# Patient Record
Sex: MALE | Race: BLACK OR AFRICAN AMERICAN | Employment: FULL TIME | URBAN - NONMETROPOLITAN AREA
[De-identification: names, ages, dates, MRNs, and addresses within clinical notes are randomized per-mention and may not be internally consistent; named-entity substitution may affect disease eponyms.]

---

## 2019-04-24 ENCOUNTER — OFFICE VISIT (OUTPATIENT)
Dept: PRIMARY CARE CLINIC | Age: 39
End: 2019-04-24
Payer: COMMERCIAL

## 2019-04-24 VITALS
DIASTOLIC BLOOD PRESSURE: 80 MMHG | WEIGHT: 247 LBS | OXYGEN SATURATION: 98 % | SYSTOLIC BLOOD PRESSURE: 130 MMHG | HEART RATE: 70 BPM | HEIGHT: 67 IN | TEMPERATURE: 97.1 F | BODY MASS INDEX: 38.77 KG/M2

## 2019-04-24 DIAGNOSIS — R53.83 OTHER FATIGUE: ICD-10-CM

## 2019-04-24 DIAGNOSIS — E55.9 VITAMIN D DEFICIENCY: ICD-10-CM

## 2019-04-24 DIAGNOSIS — Z00.00 PHYSICAL EXAM, ANNUAL: ICD-10-CM

## 2019-04-24 DIAGNOSIS — Z13.220 SCREENING FOR CHOLESTEROL LEVEL: ICD-10-CM

## 2019-04-24 DIAGNOSIS — R63.5 WEIGHT GAIN: Primary | ICD-10-CM

## 2019-04-24 DIAGNOSIS — Z76.89 ENCOUNTER TO ESTABLISH CARE: ICD-10-CM

## 2019-04-24 PROCEDURE — 99203 OFFICE O/P NEW LOW 30 MIN: CPT | Performed by: NURSE PRACTITIONER

## 2019-04-24 ASSESSMENT — PATIENT HEALTH QUESTIONNAIRE - PHQ9
SUM OF ALL RESPONSES TO PHQ9 QUESTIONS 1 & 2: 0
SUM OF ALL RESPONSES TO PHQ QUESTIONS 1-9: 0
1. LITTLE INTEREST OR PLEASURE IN DOING THINGS: 0
SUM OF ALL RESPONSES TO PHQ QUESTIONS 1-9: 0
2. FEELING DOWN, DEPRESSED OR HOPELESS: 0

## 2019-04-24 ASSESSMENT — ENCOUNTER SYMPTOMS
SHORTNESS OF BREATH: 0
GASTROINTESTINAL NEGATIVE: 1
WHEEZING: 0
APNEA: 0

## 2019-04-24 NOTE — PROGRESS NOTES
2019    Harshal Brewer (:  1980) is a 44 y.o. male, here for evaluation of the following medical concerns:  Chief Complaint   Patient presents with    New Patient    Allergies    Fatigue    Annual Exam     HPI  Is a 68-year-old male patient who presents to the office to establish care. She states she recently relocated here to take care of his elderly mother. He tells me that he has had a difficult time adjusting to his new schedule as he is working midnight. She is also struggling with seasonal allergies and tells me that he feels tired all the time has had some weight gain and feels that something has changed with his health. He would like to have some routine lab work and a physical examination to determine if he's had any significant concerns as his fatigue level has increased and his activity level has diminished. He states he can tolerate activity but gets extremely fatigued with any ongoing activities. Unsure if this is due to the change in his schedule, living arrangements and his life in general. Currently not in any distress he denies chest pain shortness of breath on my diaphoresis or other symptoms of cardiovascular disease. She is obese with a body mass index of 38.69,nearing morbidly obesity. Is also having some stiffness and arthralgias in both hands. Denies snoring or morning headache. Review of Systems   Constitutional: Positive for activity change and fatigue. Negative for appetite change, chills, diaphoresis, fever and unexpected weight change. HENT: Positive for congestion, postnasal drip and sinus pressure. Negative for trouble swallowing and voice change. Eyes: Negative for visual disturbance. Respiratory: Negative for apnea, shortness of breath and wheezing. Cardiovascular: Negative for chest pain, palpitations and leg swelling. Gastrointestinal: Negative. Endocrine: Negative. Genitourinary: Negative. Musculoskeletal: Positive for arthralgias. Both hands   Skin: Negative. Allergic/Immunologic: Positive for environmental allergies. Neurological: Negative. Psychiatric/Behavioral: Positive for decreased concentration. Negative for agitation, behavioral problems, confusion, dysphoric mood, hallucinations, self-injury, sleep disturbance and suicidal ideas. The patient is not nervous/anxious and is not hyperactive. Prior to Visit Medications    Not on File        Allergies   Allergen Reactions    Morphine Anaphylaxis       Past Medical History:   Diagnosis Date    Allergic rhinitis     Dislocation of right shoulder joint 2000    Laceration of left elbow        History reviewed. No pertinent surgical history.     Social History     Socioeconomic History    Marital status: Single     Spouse name: Not on file    Number of children: Not on file    Years of education: Not on file    Highest education level: Not on file   Occupational History    Not on file   Social Needs    Financial resource strain: Not on file    Food insecurity:     Worry: Not on file     Inability: Not on file    Transportation needs:     Medical: Not on file     Non-medical: Not on file   Tobacco Use    Smoking status: Former Smoker     Packs/day: 0.25     Types: Cigarettes     Start date:      Last attempt to quit: 2006     Years since quittin.3    Smokeless tobacco: Never Used   Substance and Sexual Activity    Alcohol use: Not Currently    Drug use: Never    Sexual activity: Not on file   Lifestyle    Physical activity:     Days per week: Not on file     Minutes per session: Not on file    Stress: Not on file   Relationships    Social connections:     Talks on phone: Not on file     Gets together: Not on file     Attends Yazidism service: Not on file     Active member of club or organization: Not on file     Attends meetings of clubs or organizations: Not on file     Relationship status: Not on file    Intimate partner violence:     Fear of current or ex partner: Not on file     Emotionally abused: Not on file     Physically abused: Not on file     Forced sexual activity: Not on file   Other Topics Concern    Not on file   Social History Narrative    Not on file        Family History   Problem Relation Age of Onset    Cancer Mother 64        Bone Cancer    Diabetes Father     Diabetes Brother        Vitals:    04/24/19 1631   BP: 130/80   Pulse: 70   Temp: 97.1 °F (36.2 °C)   SpO2: 98%   Weight: 247 lb (112 kg)   Height: 5' 7\" (1.702 m)     Estimated body mass index is 38.69 kg/m² as calculated from the following:    Height as of this encounter: 5' 7\" (1.702 m). Weight as of this encounter: 247 lb (112 kg). Physical Exam   Constitutional: He is oriented to person, place, and time. He appears well-developed and well-nourished. HENT:   Head: Normocephalic and atraumatic. Eyes: Pupils are equal, round, and reactive to light. Conjunctivae and EOM are normal.   Neck: Normal range of motion. Neck supple. No JVD present. No thyromegaly present. Cardiovascular: Normal rate, regular rhythm, normal heart sounds and intact distal pulses. No murmur heard. Pulmonary/Chest: Effort normal and breath sounds normal. No respiratory distress. He has no wheezes. He exhibits no tenderness. Abdominal: Soft. Bowel sounds are normal.   Genitourinary:   Genitourinary Comments: Deferred   Musculoskeletal: Normal range of motion. He exhibits no edema. Lymphadenopathy:     He has no cervical adenopathy. Neurological: He is alert and oriented to person, place, and time. He has normal reflexes. Skin: Skin is warm and dry. Psychiatric: He has a normal mood and affect. His behavior is normal. Thought content normal.   Nursing note and vitals reviewed.       ASSESSMENT/PLAN:    Samuel Beverly was seen today for new patient, allergies, fatigue and annual exam.    Diagnoses and all orders for this visit:    Weight gain  Comments:  I think we should check patient's vitamin D level thyroid levels he denies any signs of obstructive sleep apnea. Orders:  -     TSH without Reflex; Future  -     T4; Future  -     Vitamin D 25 Hydroxy; Future  -     Vitamin B12 & Folate; Future    Other fatigue  Comments: This could be due to the change in environment and shift work with associated weight gain feel benefit to checking thyroid levels and vitamin D level  Orders:  -     CBC; Future  -     Comprehensive Metabolic Panel; Future  -     TSH without Reflex; Future  -     T4; Future  -     Vitamin D 25 Hydroxy; Future  -     Vitamin B12 & Folate; Future  -     Testosterone Free and Total, Non-Male; Future    Encounter to establish care    Screening for cholesterol level  -     Lipid Panel; Future    Vitamin D deficiency  -     Vitamin D 25 Hydroxy; Future    Physical exam, annual  Comments:  Discussed importance of health maintenance, diet and exercise,BMI 36.9      EMR Dragon/transcription disclaimer: Much of this encounter note is an electronic transcription/translation of spoken language to printed text. The electronic translation of spoken language may permit erroneous, or at times, nonsensical words or phrases to be inadvertently transcribed. Although I have reviewed the note for such errors, some may still exist        Be accountable for what you eat, lose it lurdes or my fitness pal will help you keep up with calories and exercise. [place] diet is best to follow. Exercise regularly 3-5 x a week at least 30min at a time. Return in about 6 months (around 10/24/2019), or if symptoms worsen or fail to improve. An  electronic signature was used to authenticate this note.     --DINA Gomez on 4/29/2019 at 9:12 AM

## 2019-04-29 ASSESSMENT — ENCOUNTER SYMPTOMS
VOICE CHANGE: 0
SINUS PRESSURE: 1
TROUBLE SWALLOWING: 0

## 2019-11-02 ENCOUNTER — HOSPITAL ENCOUNTER (EMERGENCY)
Facility: HOSPITAL | Age: 39
Discharge: HOME | End: 2019-11-02
Admitting: EMERGENCY MEDICINE

## 2019-11-02 ENCOUNTER — OFFICE VISIT (OUTPATIENT)
Dept: URGENT CARE | Age: 39
End: 2019-11-02

## 2019-11-02 ENCOUNTER — APPOINTMENT (OUTPATIENT)
Dept: GENERAL RADIOLOGY | Facility: HOSPITAL | Age: 39
End: 2019-11-02

## 2019-11-02 ENCOUNTER — APPOINTMENT (OUTPATIENT)
Dept: ULTRASOUND IMAGING | Facility: HOSPITAL | Age: 39
End: 2019-11-02

## 2019-11-02 VITALS
RESPIRATION RATE: 16 BRPM | TEMPERATURE: 98.9 F | HEART RATE: 78 BPM | WEIGHT: 249.8 LBS | DIASTOLIC BLOOD PRESSURE: 88 MMHG | BODY MASS INDEX: 39.21 KG/M2 | SYSTOLIC BLOOD PRESSURE: 136 MMHG | HEIGHT: 67 IN | OXYGEN SATURATION: 97 %

## 2019-11-02 VITALS
SYSTOLIC BLOOD PRESSURE: 135 MMHG | OXYGEN SATURATION: 95 % | DIASTOLIC BLOOD PRESSURE: 92 MMHG | TEMPERATURE: 97.6 F | WEIGHT: 252 LBS | RESPIRATION RATE: 16 BRPM | BODY MASS INDEX: 39.55 KG/M2 | HEIGHT: 67 IN | HEART RATE: 82 BPM

## 2019-11-02 DIAGNOSIS — M79.604 PAIN OF RIGHT LOWER EXTREMITY: Primary | ICD-10-CM

## 2019-11-02 DIAGNOSIS — I82.811 SUPERFICIAL THROMBOSIS OF RIGHT LOWER EXTREMITY: Primary | ICD-10-CM

## 2019-11-02 LAB
ANION GAP SERPL CALCULATED.3IONS-SCNC: 10 MMOL/L (ref 5–15)
APTT PPP: 30.9 SECONDS (ref 24.1–35)
BASOPHILS # BLD AUTO: 0.04 10*3/MM3 (ref 0–0.2)
BASOPHILS NFR BLD AUTO: 0.4 % (ref 0–1.5)
BUN BLD-MCNC: 9 MG/DL (ref 6–20)
BUN/CREAT SERPL: 10.1 (ref 7–25)
CALCIUM SPEC-SCNC: 9.3 MG/DL (ref 8.6–10.5)
CHLORIDE SERPL-SCNC: 106 MMOL/L (ref 98–107)
CO2 SERPL-SCNC: 26 MMOL/L (ref 22–29)
CREAT BLD-MCNC: 0.89 MG/DL (ref 0.76–1.27)
DEPRECATED RDW RBC AUTO: 40.8 FL (ref 37–54)
EOSINOPHIL # BLD AUTO: 0.44 10*3/MM3 (ref 0–0.4)
EOSINOPHIL NFR BLD AUTO: 4.6 % (ref 0.3–6.2)
ERYTHROCYTE [DISTWIDTH] IN BLOOD BY AUTOMATED COUNT: 12.4 % (ref 12.3–15.4)
GFR SERPL CREATININE-BSD FRML MDRD: 115 ML/MIN/1.73
GLUCOSE BLD-MCNC: 114 MG/DL (ref 65–99)
HCT VFR BLD AUTO: 43 % (ref 37.5–51)
HGB BLD-MCNC: 15.3 G/DL (ref 13–17.7)
IMM GRANULOCYTES # BLD AUTO: 0.03 10*3/MM3 (ref 0–0.05)
IMM GRANULOCYTES NFR BLD AUTO: 0.3 % (ref 0–0.5)
INR PPP: 1.18 (ref 0.91–1.09)
LYMPHOCYTES # BLD AUTO: 1.86 10*3/MM3 (ref 0.7–3.1)
LYMPHOCYTES NFR BLD AUTO: 19.5 % (ref 19.6–45.3)
MCH RBC QN AUTO: 31.9 PG (ref 26.6–33)
MCHC RBC AUTO-ENTMCNC: 35.6 G/DL (ref 31.5–35.7)
MCV RBC AUTO: 89.6 FL (ref 79–97)
MONOCYTES # BLD AUTO: 1.02 10*3/MM3 (ref 0.1–0.9)
MONOCYTES NFR BLD AUTO: 10.7 % (ref 5–12)
NEUTROPHILS # BLD AUTO: 6.17 10*3/MM3 (ref 1.7–7)
NEUTROPHILS NFR BLD AUTO: 64.5 % (ref 42.7–76)
NRBC BLD AUTO-RTO: 0 /100 WBC (ref 0–0.2)
PLATELET # BLD AUTO: 272 10*3/MM3 (ref 140–450)
PMV BLD AUTO: 9.9 FL (ref 6–12)
POTASSIUM BLD-SCNC: 3.7 MMOL/L (ref 3.5–5.2)
PROTHROMBIN TIME: 15.4 SECONDS (ref 11.9–14.6)
RBC # BLD AUTO: 4.8 10*6/MM3 (ref 4.14–5.8)
SODIUM BLD-SCNC: 142 MMOL/L (ref 136–145)
WBC NRBC COR # BLD: 9.56 10*3/MM3 (ref 3.4–10.8)

## 2019-11-02 PROCEDURE — 93971 EXTREMITY STUDY: CPT

## 2019-11-02 PROCEDURE — 85025 COMPLETE CBC W/AUTO DIFF WBC: CPT | Performed by: NURSE PRACTITIONER

## 2019-11-02 PROCEDURE — 85610 PROTHROMBIN TIME: CPT | Performed by: NURSE PRACTITIONER

## 2019-11-02 PROCEDURE — 99283 EMERGENCY DEPT VISIT LOW MDM: CPT

## 2019-11-02 PROCEDURE — 72170 X-RAY EXAM OF PELVIS: CPT

## 2019-11-02 PROCEDURE — 93971 EXTREMITY STUDY: CPT | Performed by: SURGERY

## 2019-11-02 PROCEDURE — 80048 BASIC METABOLIC PNL TOTAL CA: CPT | Performed by: NURSE PRACTITIONER

## 2019-11-02 PROCEDURE — 85730 THROMBOPLASTIN TIME PARTIAL: CPT | Performed by: NURSE PRACTITIONER

## 2019-11-02 NOTE — DISCHARGE INSTRUCTIONS
Follow up with one of the [facility] physician groups below to setup primary care. If you have trouble making an appointment, please call the [facility] Nurse Line at [phone]    Dr. Paola Shea DO, Dr. Brian hOara DO, and JAYNE Williamson  [facility]  [address] [place] [address], [address] [phone]    Dr. Taj Ramirez MD  [facility] - [address] [address], [address], [place], [place] [id number] [phone]    Dr. Derrick Bhat DO, Dr. Aubrey Macario DO,  JAYNE Tenorio, and JAYNE Curran  [facility] - [address] [address], [address], [place], [place] [id number] [phone]     Dr. Krystal Martinez MD, and JAYNE Carrillo  [facility] [address] [address], [place], [place] [id number] [phone]    Dr. Abel Maier MD and Dr. Gallo Khan MD  [address], [address] [phone]    Dr. Joel Roach MD  [facility]  [address] [place] [address], [address], [place], [place], [address] [phone]    Dr. Aramis Hernandez MD  [facility]  [address] [place], [place], [address] [phone]

## 2019-11-02 NOTE — ED PROVIDER NOTES
Subjective   Patient is a 39-year-old male that presents to the ER today with complaint of groin pain.  The patient reports that for 3 days he has had pain from the right groin and right calf area.  Patient reports that he has had no known injury.  The patient reports that he exercises daily but does not believe that he has pulled anything or injured himself.  The patient denies any previous history of PE or DVT in the past.  He denies any chest pain or shortness of breath.  He denies any abdominal pain.  He denies any low back pain the patient denies any recent long distance travel, any recent surgical interventions or home therapy.  The patient is ambulatory in the ER.  He presents here today for further evaluation.        History provided by:  Patient   used: No    Leg Pain   Lower extremity pain location: pain from rt groin to rt calf   Time since incident:  3 days  Injury: no    Pain details:     Quality:  Aching and dull    Radiates to: radiates from groin to calf.    Severity:  Mild    Onset quality:  Sudden    Duration:  3 days    Timing:  Constant    Progression:  Worsening  Chronicity:  New  Dislocation: no    Foreign body present:  No foreign bodies  Prior injury to area:  No  Relieved by:  Nothing  Worsened by:  Nothing  Ineffective treatments:  None tried  Associated symptoms: no back pain, no decreased ROM, no fatigue, no fever, no itching, no muscle weakness, no neck pain, no numbness, no stiffness, no swelling and no tingling    Risk factors: no concern for non-accidental trauma, no frequent fractures, no known bone disorder, no obesity and no recent illness        Review of Systems   Constitutional: Negative for fatigue and fever.   Musculoskeletal: Negative for back pain, neck pain and stiffness.   Skin: Negative for itching.   All other systems reviewed and are negative.      History reviewed. No pertinent past medical history.    Allergies   Allergen Reactions   • Morphine GI  Intolerance       History reviewed. No pertinent surgical history.    History reviewed. No pertinent family history.    Social History     Socioeconomic History   • Marital status: Single     Spouse name: Not on file   • Number of children: Not on file   • Years of education: Not on file   • Highest education level: Not on file           Objective   Physical Exam   Constitutional: He is oriented to person, place, and time. He appears well-developed and well-nourished.   Cardiovascular: Normal rate.   Pulmonary/Chest: Effort normal.   Musculoskeletal:        Legs:  Neurological: He is alert and oriented to person, place, and time.   Skin: Skin is warm and dry. Capillary refill takes less than 2 seconds.   Psychiatric: He has a normal mood and affect.   Nursing note and vitals reviewed.      Procedures           ED Course  ED Course as of Nov 02 1405   Sat Nov 02, 2019   1311 I did review the Xray and US results; discussed with Dr. Campos; due to extent of SVT, discussed starting on Xarelto. Risks vs benefits of medication discussed with pt. Pt agreeable to starting xarelto. Will check baseline labs.   [LF]   1324 Pt denies any contraindications to anticoagulants.   [LF]   1401 Pt labs show normal creatinine, H/H and platelet count stable.   [LF]   1402 Discussed with Dr. Campos; will start on Xarelto. Pt referred to Pcp and advised to f/u in 1-2 days for a recheck. Advised to return immediately if any new or worsening symptoms. Pt will be DC home at this time in stable cond.   [LF]      ED Course User Index  [LF] Hannah Gloria M, APRN        XR Pelvis 1 or 2 View   Final Result   No acute osseous abnormality.   This report was finalized on 11/02/2019 11:16 by Dr. Zach Conti MD.      US Venous Doppler Lower Extremity Right (duplex)    (Results Pending)     Labs Reviewed   BASIC METABOLIC PANEL - Abnormal; Notable for the following components:       Result Value    Glucose 114 (*)     All other components  within normal limits    Narrative:     GFR Normal >60  Chronic Kidney Disease <60  Kidney Failure <15   PROTIME-INR - Abnormal; Notable for the following components:    Protime 15.4 (*)     INR 1.18 (*)     All other components within normal limits   CBC WITH AUTO DIFFERENTIAL - Abnormal; Notable for the following components:    Lymphocyte % 19.5 (*)     Monocytes, Absolute 1.02 (*)     Eosinophils, Absolute 0.44 (*)     All other components within normal limits   APTT - Normal   CBC AND DIFFERENTIAL    Narrative:     The following orders were created for panel order CBC & Differential.  Procedure                               Abnormality         Status                     ---------                               -----------         ------                     CBC Auto Differential[[id number]]        Abnormal            Final result                 Please view results for these tests on the individual orders.             MDM  Number of Diagnoses or Management Options  Superficial thrombosis of right lower extremity: new and requires workup     Amount and/or Complexity of Data Reviewed  Tests in the radiology section of CPT®: ordered and reviewed  Discuss the patient with other providers: yes    Patient Progress  Patient progress: stable      Final diagnoses:   Superficial thrombosis of right lower extremity              Hannah Gloria, APRN  11/02/19 1405

## 2019-11-26 ENCOUNTER — TRANSCRIBE ORDERS (OUTPATIENT)
Dept: ADMINISTRATIVE | Facility: HOSPITAL | Age: 39
End: 2019-11-26

## 2019-11-26 ENCOUNTER — LAB (OUTPATIENT)
Dept: LAB | Facility: HOSPITAL | Age: 39
End: 2019-11-26

## 2019-11-26 DIAGNOSIS — R19.7 DIARRHEA, UNSPECIFIED TYPE: Primary | ICD-10-CM

## 2019-11-26 DIAGNOSIS — R19.7 DIARRHEA, UNSPECIFIED TYPE: ICD-10-CM

## 2019-11-26 LAB
ADV 40+41 DNA STL QL NAA+NON-PROBE: NOT DETECTED
ALBUMIN SERPL-MCNC: 4.3 G/DL (ref 3.5–5)
ALBUMIN/GLOB SERPL: 1 G/DL (ref 1.1–2.5)
ALP SERPL-CCNC: 65 U/L (ref 24–120)
ALT SERPL W P-5'-P-CCNC: 28 U/L (ref 0–54)
ANION GAP SERPL CALCULATED.3IONS-SCNC: 11 MMOL/L (ref 4–13)
AST SERPL-CCNC: 25 U/L (ref 7–45)
ASTRO TYP 1-8 RNA STL QL NAA+NON-PROBE: NOT DETECTED
AUTO MIXED CELLS #: 1 10*3/MM3 (ref 0.1–2.6)
AUTO MIXED CELLS %: 8.2 % (ref 0.1–24)
BACTERIA UR QL AUTO: ABNORMAL /HPF
BILIRUB SERPL-MCNC: 0.6 MG/DL (ref 0.1–1)
BILIRUB UR QL STRIP: ABNORMAL
BUN BLD-MCNC: 8 MG/DL (ref 5–21)
BUN/CREAT SERPL: 6.6
C CAYETANENSIS DNA STL QL NAA+NON-PROBE: NOT DETECTED
C DIFF TOX GENS STL QL NAA+PROBE: NOT DETECTED
CALCIUM SPEC-SCNC: 8.9 MG/DL (ref 8.4–10.4)
CAMPY SP DNA.DIARRHEA STL QL NAA+PROBE: DETECTED
CHLORIDE SERPL-SCNC: 105 MMOL/L (ref 98–110)
CLARITY UR: CLEAR
CO2 SERPL-SCNC: 26 MMOL/L (ref 24–31)
COLOR UR: YELLOW
CREAT BLD-MCNC: 1.21 MG/DL (ref 0.5–1.4)
CRYPTOSP STL CULT: NOT DETECTED
E COLI DNA SPEC QL NAA+PROBE: NOT DETECTED
E HISTOLYT AG STL-ACNC: NOT DETECTED
EAEC PAA PLAS AGGR+AATA ST NAA+NON-PRB: NOT DETECTED
EC STX1 + STX2 GENES STL NAA+PROBE: NOT DETECTED
EPEC EAE GENE STL QL NAA+NON-PROBE: NOT DETECTED
ERYTHROCYTE [DISTWIDTH] IN BLOOD BY AUTOMATED COUNT: 12.9 % (ref 12.3–15.4)
ETEC LTA+ST1A+ST1B TOX ST NAA+NON-PROBE: NOT DETECTED
G LAMBLIA DNA SPEC QL NAA+PROBE: NOT DETECTED
GFR SERPL CREATININE-BSD FRML MDRD: 81 ML/MIN/1.73
GLOBULIN UR ELPH-MCNC: 4.2 GM/DL
GLUCOSE BLD-MCNC: 106 MG/DL (ref 70–100)
GLUCOSE UR STRIP-MCNC: NEGATIVE MG/DL
HCT VFR BLD AUTO: 42.9 % (ref 37.5–51)
HGB BLD-MCNC: 14.8 G/DL (ref 13–17.7)
HGB UR QL STRIP.AUTO: ABNORMAL
HYALINE CASTS UR QL AUTO: ABNORMAL /LPF
KETONES UR QL STRIP: NEGATIVE
LEUKOCYTE ESTERASE UR QL STRIP.AUTO: NEGATIVE
LYMPHOCYTES # BLD AUTO: 1.7 10*3/MM3 (ref 0.7–3.1)
LYMPHOCYTES NFR BLD AUTO: 14.8 % (ref 19.6–45.3)
MCH RBC QN AUTO: 31.4 PG (ref 26.6–33)
MCHC RBC AUTO-ENTMCNC: 34.5 G/DL (ref 31.5–35.7)
MCV RBC AUTO: 90.9 FL (ref 79–97)
NEUTROPHILS # BLD AUTO: 9 10*3/MM3 (ref 1.7–7)
NEUTROPHILS NFR BLD AUTO: 77 % (ref 42.7–76)
NITRITE UR QL STRIP: NEGATIVE
NOROVIRUS GI+II RNA STL QL NAA+NON-PROBE: NOT DETECTED
P SHIGELLOIDES DNA STL QL NAA+PROBE: NOT DETECTED
PH UR STRIP.AUTO: 6 [PH] (ref 5–8)
PLATELET # BLD AUTO: 239 10*3/MM3 (ref 140–450)
PMV BLD AUTO: 9.2 FL (ref 6–12)
POTASSIUM BLD-SCNC: 3.6 MMOL/L (ref 3.5–5.3)
PROT SERPL-MCNC: 8.5 G/DL (ref 6.3–8.7)
PROT UR QL STRIP: ABNORMAL
RBC # BLD AUTO: 4.72 10*6/MM3 (ref 4.14–5.8)
RBC # UR: ABNORMAL /HPF
REF LAB TEST METHOD: ABNORMAL
RV RNA STL NAA+PROBE: NOT DETECTED
SALMONELLA DNA SPEC QL NAA+PROBE: NOT DETECTED
SAPO I+II+IV+V RNA STL QL NAA+NON-PROBE: NOT DETECTED
SHIGELLA SP+EIEC IPAH STL QL NAA+PROBE: NOT DETECTED
SODIUM BLD-SCNC: 142 MMOL/L (ref 135–145)
SP GR UR STRIP: >=1.03 (ref 1–1.03)
SQUAMOUS #/AREA URNS HPF: ABNORMAL /HPF
UROBILINOGEN UR QL STRIP: ABNORMAL
V CHOLERAE DNA SPEC QL NAA+PROBE: NOT DETECTED
VIBRIO DNA SPEC NAA+PROBE: NOT DETECTED
WBC NRBC COR # BLD: 11.7 10*3/MM3 (ref 3.4–10.8)
WBC UR QL AUTO: ABNORMAL /HPF
YEAST URNS QL MICRO: ABNORMAL /HPF
YERSINIA STL CULT: NOT DETECTED

## 2019-11-26 PROCEDURE — 87086 URINE CULTURE/COLONY COUNT: CPT | Performed by: NURSE PRACTITIONER

## 2019-11-26 PROCEDURE — 36415 COLL VENOUS BLD VENIPUNCTURE: CPT | Performed by: NURSE PRACTITIONER

## 2019-11-26 PROCEDURE — 80053 COMPREHEN METABOLIC PANEL: CPT | Performed by: NURSE PRACTITIONER

## 2019-11-26 PROCEDURE — 81001 URINALYSIS AUTO W/SCOPE: CPT | Performed by: NURSE PRACTITIONER

## 2019-11-26 PROCEDURE — 0097U HC BIOFIRE FILMARRAY GI PANEL: CPT | Performed by: NURSE PRACTITIONER

## 2019-11-26 PROCEDURE — 85025 COMPLETE CBC W/AUTO DIFF WBC: CPT | Performed by: NURSE PRACTITIONER

## 2019-11-28 LAB — BACTERIA SPEC AEROBE CULT: NO GROWTH

## 2021-08-04 ENCOUNTER — OFFICE VISIT (OUTPATIENT)
Dept: URGENT CARE | Age: 41
End: 2021-08-04
Payer: COMMERCIAL

## 2021-08-04 ENCOUNTER — OFFICE VISIT (OUTPATIENT)
Age: 41
End: 2021-08-04

## 2021-08-04 VITALS
WEIGHT: 251 LBS | RESPIRATION RATE: 18 BRPM | BODY MASS INDEX: 39.39 KG/M2 | HEART RATE: 93 BPM | HEIGHT: 67 IN | DIASTOLIC BLOOD PRESSURE: 95 MMHG | SYSTOLIC BLOOD PRESSURE: 158 MMHG | TEMPERATURE: 98.3 F | OXYGEN SATURATION: 97 %

## 2021-08-04 DIAGNOSIS — Z91.89 AT INCREASED RISK OF EXPOSURE TO COVID-19 VIRUS: Primary | ICD-10-CM

## 2021-08-04 DIAGNOSIS — Z11.59 SCREENING FOR VIRAL DISEASE: Primary | ICD-10-CM

## 2021-08-04 LAB — SARS-COV-2, PCR: DETECTED

## 2021-08-04 PROCEDURE — 99212 OFFICE O/P EST SF 10 MIN: CPT | Performed by: NURSE PRACTITIONER

## 2021-08-04 PROCEDURE — 99999 PR OFFICE/OUTPT VISIT,PROCEDURE ONLY: CPT | Performed by: NURSE PRACTITIONER

## 2021-08-04 ASSESSMENT — ENCOUNTER SYMPTOMS
VOMITING: 0
DIARRHEA: 1
SHORTNESS OF BREATH: 0
ABDOMINAL PAIN: 0
COUGH: 0
CHEST TIGHTNESS: 0
WHEEZING: 0
NAUSEA: 0
BACK PAIN: 0

## 2021-08-04 NOTE — PATIENT INSTRUCTIONS
utensils. Clean and disinfect your home every day. Use household  or disinfectant wipes or sprays. Take special care to clean things that you grab with your hands. These include doorknobs, remote controls, phones, and handles on your refrigerator and microwave. And don't forget countertops, tabletops, bathrooms, and computer keyboards. Current as of: March 26, 2021               Content Version: 12.9  © 2006-2021 [employer]. Care instructions adapted under license by [facility] ([facility]). If you have questions about a medical condition or this instruction, always ask your healthcare professional. [place] [address] any warranty or liability for your use of this information.

## 2021-08-04 NOTE — PROGRESS NOTES
[address] [facility]  [address] [address] [place] [address]  Dept: [phone]  Dept Fax: [phone]  Loc: [phone]  Zahira Tavares is a 39 y.o. male who presents today for his medical conditions/complaintsas noted below. Zahira Tavares is c/o of Diarrhea and Concern For COVID-19      HPI:     Diarrhea   This is a new problem. The current episode started yesterday. The problem has been unchanged. The patient states that diarrhea does not awaken him from sleep. Associated symptoms include headaches and myalgias. Pertinent negatives include no abdominal pain, chills, coughing, fever, sweats, vomiting or weight loss. Associated symptoms comments: Generalized body ache. Nothing aggravates the symptoms. Risk factors include ill contacts (Exposure to covid positive person). He has tried nothing for the symptoms. The treatment provided no relief. There is no history of bowel resection, inflammatory bowel disease, irritable bowel syndrome or a recent abdominal surgery. Past Medical History:   Diagnosis Date    Allergic rhinitis     Dislocation of right shoulder joint 2000    Laceration of left elbow 2012      No past surgical history on file. Family History   Problem Relation Age of Onset    Cancer Mother 64        Bone Cancer    Diabetes Father     Diabetes Brother        Social History     Tobacco Use    Smoking status: Former Smoker     Packs/day: 0.25     Types: Cigarettes     Start date: 2006     Quit date: 2006     Years since quitting: 15.6    Smokeless tobacco: Never Used   Substance Use Topics    Alcohol use: Not Currently      No current outpatient medications on file. No current facility-administered medications for this visit.      Allergies   Allergen Reactions    Morphine Anaphylaxis       Health Maintenance   Topic Date Due    Hepatitis C screen  Never done    Varicella vaccine (1 of 2 - 2-dose childhood series) Never done    COVID-19 Vaccine (1) Never done    HIV screen  Never done    Lipid screen  Never done    Diabetes screen  Never done    Flu vaccine (1) 09/01/2021    DTaP/Tdap/Td vaccine (2 - Td or Tdap) 05/01/2027    Hepatitis A vaccine  Aged Out    Hepatitis B vaccine  Aged Out    Hib vaccine  Aged Out    Meningococcal (ACWY) vaccine  Aged Out    Pneumococcal 0-64 years Vaccine  Aged Out       Subjective:     Review of Systems   Constitutional: Negative for chills, fatigue, fever and weight loss. HENT: Negative. Respiratory: Negative for cough, chest tightness, shortness of breath and wheezing. Cardiovascular: Negative for chest pain. Gastrointestinal: Positive for diarrhea. Negative for abdominal pain, nausea and vomiting. Musculoskeletal: Positive for myalgias. Negative for back pain, neck pain and neck stiffness. Skin: Negative. Neurological: Positive for headaches. Negative for weakness and light-headedness. Objective:     Physical Exam  Vitals and nursing note reviewed. Constitutional:       General: He is not in acute distress. HENT:      Head: Normocephalic and atraumatic. Cardiovascular:      Rate and Rhythm: Normal rate and regular rhythm. Pulses: Normal pulses. Heart sounds: Normal heart sounds. Pulmonary:      Effort: Pulmonary effort is normal. No respiratory distress. Breath sounds: Normal breath sounds. No wheezing or rhonchi. Abdominal:      General: Bowel sounds are normal. There is no distension. Palpations: Abdomen is soft. Tenderness: There is no abdominal tenderness. There is no right CVA tenderness or left CVA tenderness. Musculoskeletal:         General: Normal range of motion. Cervical back: Normal range of motion. Skin:     General: Skin is warm and dry. Neurological:      General: No focal deficit present. Mental Status: He is alert and oriented to person, place, and time.    Psychiatric:         Behavior: Behavior normal.       BP (!) 158/95   Pulse 93   Temp 98.3 °F (36.8 °C) (Temporal)   Resp 18   Ht 5' 7\" (1.702 m)   Wt 251 lb (113.9 kg)   SpO2 97%   BMI 39.31 kg/m²     Assessment:      Diagnosis Orders   1. At increased risk of exposure to COVID-19 virus         Plan:    No orders of the defined types were placed in this encounter. Covid test     Return if symptoms worsen or fail to improve. No orders of the defined types were placed in this encounter. Patient given educationalmaterials - see patient instructions. Discussed use, benefit, and side effectsof prescribed medications. All patient questions answered. Pt voiced understanding. Reviewed health maintenance. Instructed to continue current medications, diet andexercise. Patient agreed with treatment plan. Follow up as directed. Patient Instructions     You will have to quarantine until the test results  Test results will be called to you within 24 hours  The health department may call you  if test result is positive  Take tylenol/ibuprofen for pain or fever  Increase hydration and rest  Go to the ER develop shortness of breath, especially if it occurs at rest  Follow up with you primary care provider  Call the clinic with any question    Patient Education        9 Things To Do If You've Been Exposed to COVID-19    Stay home. If you've been exposed to the virus but don't have symptoms, you may need to stay in quarantine for up to 14 days. In some cases it may be shorter. Ask your doctor when it's safe to end your quarantine. Be sure to follow all instructions from your local health authorities. Don't go to school, work, or public areas. And don't use public transportation, ride-shares, or taxis unless you have no choice. Leave your home only if you need to get medical care. But call the doctor's office first so they know you're coming, and wear a cloth face cover when you go. Call your doctor.   Call your doctor or other health professional to let them know that you've been exposed. They might want you to be tested, or they may have other instructions for you. If you become sick, wear a face cover when you are around other people. It can help stop the spread of the virus when you cough or sneeze. Limit contact with people in your home. If possible, stay in a separate bedroom and use a separate bathroom. Avoid contact with pets and other animals. Cover your mouth and nose with a tissue when you cough or sneeze. Then throw it in the trash right away. Wash your hands often, especially after you cough or sneeze. Use soap and water, and scrub for at least 20 seconds. If soap and water aren't available, use an alcohol-based hand . Don't share personal household items. These include bedding, towels, cups and glasses, and eating utensils. Clean and disinfect your home every day. Use household  or disinfectant wipes or sprays. Take special care to clean things that you grab with your hands. These include doorknobs, remote controls, phones, and handles on your refrigerator and microwave. And don't forget countertops, tabletops, bathrooms, and computer keyboards. Current as of: March 26, 2021               Content Version: 12.9  © 2006-2021 Healthwise, Inc[employer]. Care instructions adapted under license by [facility] ([facility]). If you have questions about a medical condition or this instruction, always ask your healthcare professional. [place] [address] any warranty or liability for your use of this information.              Electronically signed by DINA Galvez CNP on 8/4/2021 at 11:06 AM

## 2021-08-07 ENCOUNTER — TELEPHONE (OUTPATIENT)
Dept: URGENT CARE | Age: 41
End: 2021-08-07

## 2021-08-07 NOTE — TELEPHONE ENCOUNTER
Patient verified . Patient notified of positive COVID test results. Advised rest and hydration. Advised to be quarantined at home for 14 days and local health department would be contacting him. If patient is getting worse to let us know. Patient verbalized understanding.

## 2022-08-03 ENCOUNTER — HOSPITAL ENCOUNTER (INPATIENT)
Age: 42
LOS: 2 days | Discharge: HOME | DRG: 176 | End: 2022-08-05
Attending: EMERGENCY MEDICINE | Admitting: INTERNAL MEDICINE
Payer: COMMERCIAL

## 2022-08-03 ENCOUNTER — TELEPHONE (OUTPATIENT)
Dept: HEMATOLOGY | Age: 42
End: 2022-08-03

## 2022-08-03 ENCOUNTER — APPOINTMENT (OUTPATIENT)
Dept: CT IMAGING | Age: 42
DRG: 176 | End: 2022-08-03
Payer: COMMERCIAL

## 2022-08-03 ENCOUNTER — APPOINTMENT (OUTPATIENT)
Dept: GENERAL RADIOLOGY | Age: 42
DRG: 176 | End: 2022-08-03
Payer: COMMERCIAL

## 2022-08-03 DIAGNOSIS — I26.99 BILATERAL PULMONARY EMBOLISM (HCC): Primary | ICD-10-CM

## 2022-08-03 LAB
ALBUMIN SERPL-MCNC: 4 G/DL (ref 3.5–5.2)
ALP BLD-CCNC: 85 U/L (ref 40–130)
ALT SERPL-CCNC: 25 U/L (ref 5–41)
ANION GAP SERPL CALCULATED.3IONS-SCNC: 13 MMOL/L (ref 7–19)
APTT: 171.5 SEC (ref 26–36.2)
APTT: 30.2 SEC (ref 26–36.2)
AST SERPL-CCNC: 21 U/L (ref 5–40)
BASOPHILS ABSOLUTE: 0 K/UL (ref 0–0.2)
BASOPHILS RELATIVE PERCENT: 0.3 % (ref 0–1)
BILIRUB SERPL-MCNC: 0.7 MG/DL (ref 0.2–1.2)
BUN BLDV-MCNC: 10 MG/DL (ref 6–20)
CALCIUM SERPL-MCNC: 9.5 MG/DL (ref 8.6–10)
CHLORIDE BLD-SCNC: 105 MMOL/L (ref 98–111)
CO2: 22 MMOL/L (ref 22–29)
CREAT SERPL-MCNC: 1.1 MG/DL (ref 0.5–1.2)
D DIMER: >20 UG/ML FEU (ref 0–0.48)
EOSINOPHILS ABSOLUTE: 0.3 K/UL (ref 0–0.6)
EOSINOPHILS RELATIVE PERCENT: 3 % (ref 0–5)
GFR AFRICAN AMERICAN: >59
GFR NON-AFRICAN AMERICAN: >60
GLUCOSE BLD-MCNC: 113 MG/DL (ref 74–109)
HCT VFR BLD CALC: 46.8 % (ref 42–52)
HEMOGLOBIN: 15.8 G/DL (ref 14–18)
IMMATURE GRANULOCYTES #: 0 K/UL
INR BLD: 1.14 (ref 0.88–1.18)
LV EF: 58 %
LVEF MODALITY: NORMAL
LYMPHOCYTES ABSOLUTE: 1.6 K/UL (ref 1.1–4.5)
LYMPHOCYTES RELATIVE PERCENT: 14.4 % (ref 20–40)
MAGNESIUM: 2.3 MG/DL (ref 1.6–2.6)
MCH RBC QN AUTO: 31.6 PG (ref 27–31)
MCHC RBC AUTO-ENTMCNC: 33.8 G/DL (ref 33–37)
MCV RBC AUTO: 93.6 FL (ref 80–94)
MONOCYTES ABSOLUTE: 1.1 K/UL (ref 0–0.9)
MONOCYTES RELATIVE PERCENT: 9.9 % (ref 0–10)
NEUTROPHILS ABSOLUTE: 7.8 K/UL (ref 1.5–7.5)
NEUTROPHILS RELATIVE PERCENT: 72 % (ref 50–65)
PDW BLD-RTO: 12.7 % (ref 11.5–14.5)
PLATELET # BLD: 207 K/UL (ref 130–400)
PMV BLD AUTO: 10 FL (ref 9.4–12.4)
POTASSIUM REFLEX MAGNESIUM: 3.4 MMOL/L (ref 3.5–5)
PRO-BNP: 51 PG/ML (ref 0–450)
PROTHROMBIN TIME: 14.6 SEC (ref 12–14.6)
RBC # BLD: 5 M/UL (ref 4.7–6.1)
SARS-COV-2, NAAT: NOT DETECTED
SODIUM BLD-SCNC: 140 MMOL/L (ref 136–145)
TOTAL PROTEIN: 8.1 G/DL (ref 6.6–8.7)
TROPONIN: 0.03 NG/ML (ref 0–0.03)
TROPONIN: 0.03 NG/ML (ref 0–0.03)
TROPONIN: 0.04 NG/ML (ref 0–0.03)
WBC # BLD: 10.8 K/UL (ref 4.8–10.8)

## 2022-08-03 PROCEDURE — 93970 EXTREMITY STUDY: CPT

## 2022-08-03 PROCEDURE — 6370000000 HC RX 637 (ALT 250 FOR IP): Performed by: NURSE PRACTITIONER

## 2022-08-03 PROCEDURE — 99223 1ST HOSP IP/OBS HIGH 75: CPT | Performed by: INTERNAL MEDICINE

## 2022-08-03 PROCEDURE — 93005 ELECTROCARDIOGRAM TRACING: CPT | Performed by: EMERGENCY MEDICINE

## 2022-08-03 PROCEDURE — 85610 PROTHROMBIN TIME: CPT

## 2022-08-03 PROCEDURE — 85730 THROMBOPLASTIN TIME PARTIAL: CPT

## 2022-08-03 PROCEDURE — 71045 X-RAY EXAM CHEST 1 VIEW: CPT

## 2022-08-03 PROCEDURE — 36415 COLL VENOUS BLD VENIPUNCTURE: CPT

## 2022-08-03 PROCEDURE — 71045 X-RAY EXAM CHEST 1 VIEW: CPT | Performed by: RADIOLOGY

## 2022-08-03 PROCEDURE — 83880 ASSAY OF NATRIURETIC PEPTIDE: CPT

## 2022-08-03 PROCEDURE — 71275 CT ANGIOGRAPHY CHEST: CPT | Performed by: RADIOLOGY

## 2022-08-03 PROCEDURE — 1210000000 HC MED SURG R&B

## 2022-08-03 PROCEDURE — 6360000004 HC RX CONTRAST MEDICATION: Performed by: INTERNAL MEDICINE

## 2022-08-03 PROCEDURE — 6360000002 HC RX W HCPCS: Performed by: EMERGENCY MEDICINE

## 2022-08-03 PROCEDURE — 85025 COMPLETE CBC W/AUTO DIFF WBC: CPT

## 2022-08-03 PROCEDURE — 85379 FIBRIN DEGRADATION QUANT: CPT

## 2022-08-03 PROCEDURE — 99285 EMERGENCY DEPT VISIT HI MDM: CPT

## 2022-08-03 PROCEDURE — 80053 COMPREHEN METABOLIC PANEL: CPT

## 2022-08-03 PROCEDURE — 83735 ASSAY OF MAGNESIUM: CPT

## 2022-08-03 PROCEDURE — 6360000004 HC RX CONTRAST MEDICATION: Performed by: EMERGENCY MEDICINE

## 2022-08-03 PROCEDURE — 84484 ASSAY OF TROPONIN QUANT: CPT

## 2022-08-03 PROCEDURE — 87635 SARS-COV-2 COVID-19 AMP PRB: CPT

## 2022-08-03 PROCEDURE — C8929 TTE W OR WO FOL WCON,DOPPLER: HCPCS

## 2022-08-03 PROCEDURE — 71275 CT ANGIOGRAPHY CHEST: CPT

## 2022-08-03 RX ORDER — SODIUM CHLORIDE 0.9 % (FLUSH) 0.9 %
5-40 SYRINGE (ML) INJECTION PRN
Status: DISCONTINUED | OUTPATIENT
Start: 2022-08-03 | End: 2022-08-05 | Stop reason: HOSPADM

## 2022-08-03 RX ORDER — FEXOFENADINE HCL 180 MG/1
180 TABLET ORAL 2 TIMES DAILY
COMMUNITY

## 2022-08-03 RX ORDER — DIPHENHYDRAMINE HCL 25 MG
50 TABLET ORAL ONCE
Status: COMPLETED | OUTPATIENT
Start: 2022-08-03 | End: 2022-08-03

## 2022-08-03 RX ORDER — HEPARIN SODIUM 1000 [USP'U]/ML
80 INJECTION, SOLUTION INTRAVENOUS; SUBCUTANEOUS PRN
Status: DISCONTINUED | OUTPATIENT
Start: 2022-08-03 | End: 2022-08-04

## 2022-08-03 RX ORDER — SODIUM CHLORIDE 0.9 % (FLUSH) 0.9 %
5-40 SYRINGE (ML) INJECTION EVERY 12 HOURS SCHEDULED
Status: DISCONTINUED | OUTPATIENT
Start: 2022-08-03 | End: 2022-08-05 | Stop reason: HOSPADM

## 2022-08-03 RX ORDER — ONDANSETRON 4 MG/1
4 TABLET, ORALLY DISINTEGRATING ORAL EVERY 8 HOURS PRN
Status: DISCONTINUED | OUTPATIENT
Start: 2022-08-03 | End: 2022-08-05 | Stop reason: HOSPADM

## 2022-08-03 RX ORDER — SODIUM CHLORIDE 9 MG/ML
INJECTION, SOLUTION INTRAVENOUS PRN
Status: DISCONTINUED | OUTPATIENT
Start: 2022-08-03 | End: 2022-08-05 | Stop reason: HOSPADM

## 2022-08-03 RX ORDER — ONDANSETRON 2 MG/ML
4 INJECTION INTRAMUSCULAR; INTRAVENOUS EVERY 6 HOURS PRN
Status: DISCONTINUED | OUTPATIENT
Start: 2022-08-03 | End: 2022-08-05 | Stop reason: HOSPADM

## 2022-08-03 RX ORDER — HEPARIN SODIUM 10000 [USP'U]/100ML
5-30 INJECTION, SOLUTION INTRAVENOUS CONTINUOUS
Status: DISCONTINUED | OUTPATIENT
Start: 2022-08-03 | End: 2022-08-04 | Stop reason: ALTCHOICE

## 2022-08-03 RX ORDER — HEPARIN SODIUM 1000 [USP'U]/ML
80 INJECTION, SOLUTION INTRAVENOUS; SUBCUTANEOUS ONCE
Status: COMPLETED | OUTPATIENT
Start: 2022-08-03 | End: 2022-08-03

## 2022-08-03 RX ORDER — POLYETHYLENE GLYCOL 3350 17 G/17G
17 POWDER, FOR SOLUTION ORAL DAILY PRN
Status: DISCONTINUED | OUTPATIENT
Start: 2022-08-03 | End: 2022-08-05 | Stop reason: HOSPADM

## 2022-08-03 RX ORDER — ACETAMINOPHEN 325 MG/1
650 TABLET ORAL EVERY 6 HOURS PRN
Status: DISCONTINUED | OUTPATIENT
Start: 2022-08-03 | End: 2022-08-05 | Stop reason: HOSPADM

## 2022-08-03 RX ORDER — HEPARIN SODIUM 1000 [USP'U]/ML
40 INJECTION, SOLUTION INTRAVENOUS; SUBCUTANEOUS PRN
Status: DISCONTINUED | OUTPATIENT
Start: 2022-08-03 | End: 2022-08-04

## 2022-08-03 RX ORDER — ACETAMINOPHEN 650 MG/1
650 SUPPOSITORY RECTAL EVERY 6 HOURS PRN
Status: DISCONTINUED | OUTPATIENT
Start: 2022-08-03 | End: 2022-08-05 | Stop reason: HOSPADM

## 2022-08-03 RX ADMIN — IOPAMIDOL 70 ML: 755 INJECTION, SOLUTION INTRAVENOUS at 10:03

## 2022-08-03 RX ADMIN — HEPARIN SODIUM 18 UNITS/KG/HR: 10000 INJECTION, SOLUTION INTRAVENOUS at 12:37

## 2022-08-03 RX ADMIN — DIPHENHYDRAMINE HYDROCHLORIDE 50 MG: 25 TABLET ORAL at 20:25

## 2022-08-03 RX ADMIN — PERFLUTREN 1.5 ML: 6.52 INJECTION, SUSPENSION INTRAVENOUS at 14:23

## 2022-08-03 RX ADMIN — HEPARIN SODIUM 9290 UNITS: 1000 INJECTION INTRAVENOUS; SUBCUTANEOUS at 12:37

## 2022-08-03 ASSESSMENT — PAIN DESCRIPTION - LOCATION: LOCATION: CHEST

## 2022-08-03 ASSESSMENT — ENCOUNTER SYMPTOMS
WHEEZING: 0
VOMITING: 0
EYE DISCHARGE: 0
SORE THROAT: 0
NAUSEA: 0
ABDOMINAL PAIN: 0
TROUBLE SWALLOWING: 0
CHOKING: 0
CHEST TIGHTNESS: 0
COLOR CHANGE: 0
VOICE CHANGE: 0
BLOOD IN STOOL: 0
FACIAL SWELLING: 0
CONSTIPATION: 0
RECTAL PAIN: 0
APNEA: 0
BACK PAIN: 0
DIARRHEA: 0
SINUS PRESSURE: 0
COUGH: 0
SHORTNESS OF BREATH: 1

## 2022-08-03 ASSESSMENT — PAIN SCALES - GENERAL
PAINLEVEL_OUTOF10: 4
PAINLEVEL_OUTOF10: 0
PAINLEVEL_OUTOF10: 0

## 2022-08-03 NOTE — CONSULTS
MEDICAL ONCOLOGY CONSULTATION    Pt Name: Melina Perez  MRN: [id number]  YOB: 1980  Date of evaluation: 8/3/2022    REASON FOR CONSULTATION: Pulmonary embolism  REQUESTING PHYSICIAN: Hospitalist    History Obtained From:    patient, electronic medical record    HISTORY OF PRESENT ILLNESS:    Diagnosis  Unprovoked DVT/PE    Treatment summary  IV heparin    Janet Kraft was first seen by me on 8/3/2022. The patient is a 43years old male who presented ER department with complaints of acute onset chest discomfort short of breath with exertion. Short of breath started 3 days ago's. Patient denies any prior history of DVT or PE. No family history. Patient recalled that he had leg cramping about a week ago. 8/3/2022-chest x-ray was unremarkable  8/3/2022-CTA showed extensive bilateral pulm emboli, more numerous in the lower lobes. The lungs are grossly clear. 8/3/2020-US lower extremity bilaterally showed positive DVT in Lt Pop V, B/L PTV, Lt Stuart and Lt Soleal veins. No evidence of SVT or reflux noted at this time. 8/3/2022-2D echo pending results    Past Medical History:    Past Medical History:   Diagnosis Date    Allergic rhinitis     Dislocation of right shoulder joint 2000    Laceration of left elbow 2012       Past Surgical History:    Dislocation right shoulder, 2000  Laceration left elbow, 2012    Social History:    Smoking status: Quit 15 years ago.   ETOH status: No  Resides: [facility]    Family History:   Family History   Problem Relation Age of Onset    Cancer Mother 64        Bone Cancer    Diabetes Father     Diabetes Brother        Current Hospital Medications:    Current Facility-Administered Medications   Medication Dose Route Frequency Provider Last Rate Last Admin    heparin (porcine) injection 9,290 Units  80 Units/kg IntraVENous PRN Delta Albe, APRN - CNP        heparin (porcine) injection 4,640 Units  40 Units/kg IntraVENous PRN Delta Albe, APRN - CNP heparin 25,000 units in dextrose 5% 250 mL (premix) infusion  5-30 Units/kg/hr IntraVENous Continuous Brown Can, APRN - CNP 20.9 mL/hr at 08/03/22 1532 18 Units/kg/hr at 08/03/22 1532    sodium chloride flush 0.9 % injection 5-40 mL  5-40 mL IntraVENous 2 times per day Brown Can, APRN - CNP        sodium chloride flush 0.9 % injection 5-40 mL  5-40 mL IntraVENous PRN Brown Can, APRN - CNP        0.9 % sodium chloride infusion   IntraVENous PRN Brown Can, APRN - CNP        ondansetron (ZOFRAN-ODT) disintegrating tablet 4 mg  4 mg Oral Q8H PRN Brown Can, APRN - CNP        Or    ondansetron (ZOFRAN) injection 4 mg  4 mg IntraVENous Q6H PRN Brown Can, APRN - CNP        polyethylene glycol (GLYCOLAX) packet 17 g  17 g Oral Daily PRN Brown Can, APRN - CNP        acetaminophen (TYLENOL) tablet 650 mg  650 mg Oral Q6H PRN Brown Can, APRN - CNP        Or    acetaminophen (TYLENOL) suppository 650 mg  650 mg Rectal Q6H PRN Brown Can, APRN - CNP        perflutren lipid microspheres (DEFINITY) injection 1.65 mg  1.5 mL IntraVENous ONCE PRN Cary Fletcher MD   1.5 mL at 08/03/22 1423       Allergies:    Allergies   Allergen Reactions    Morphine Anaphylaxis         Subjective   REVIEW OF SYSTEMS:   CONSTITUTIONAL: no fever, no night sweats, fatigue;  HEENT: no blurring of vision, no double vision, no hearing difficulty, no tinnitus, no ulceration, no epistaxis;  LUNGS: no cough, no hemoptysis, no wheeze,  shortness of breath;  CARDIOVASCULAR: no palpitation,  chest pain,  shortness of breath;  GI: no abdominal pain, no nausea, no vomiting, no diarrhea, no constipation;  SAIMA: no dysuria, no hematuria, no frequency or urgency, no nephrolithiasis;  MUSCULOSKELETAL: Myalgias, no joint pain, no swelling, no stiffness;  ENDOCRINE: no polyuria, no polydipsia, no cold or heat intolerance;  HEMATOLOGY: no easy bruising or bleeding, no history of clotting disorder;  DERMATOLOGY: no skin rash, no eczema, no pruritus;  PSYCHIATRY: no depression, no anxiety  NEUROLOGY: no syncope, no seizures, no numbness or tingling of hands, no numbness or tingling of feet, no paresis;    Objective   BP (!) 137/97   Pulse 91   Temp 98.7 °F (37.1 °C) (Temporal)   Resp 16   Ht 5' 8\" (1.727 m)   Wt 246 lb (111.6 kg)   SpO2 95%   BMI 37.40 kg/m²     PHYSICAL EXAM:  CONSTITUTIONAL: Alert, appropriate, no acute distress  EYES: Non icteric, EOM intact, pupils equal round   ENT: Mucus membranes moist,external inspection of ears and nose are normal  NECK: Supple, no masses. No palpable thyroid mass  CHEST/LUNGS: CTA bilaterally, normal respiratory effort   CARDIOVASCULAR: RRR, no murmurs. No lower extremity edema  ABDOMEN: soft non-tender, active bowel sounds, no HSM. No palpable masses  EXTREMITIES: warm, full ROM in all 4 extremities, no focal weakness. SKIN: warm, dry with no rashes or lesions  LYMPH: No cervical, clavicular, axillary, or inguinal lymphadenopathy  NEUROLOGIC: follows commands, non focal   PSYCH: mood and affect appropriate.  Alert and oriented to time, place, person        LABORATORY RESULTS REVIEWED/ANALYZED BY ME:  Recent Labs     08/03/22  0835   WBC 10.8   HGB 15.8   HCT 46.8   MCV 93.6          Lab Results   Component Value Date     08/03/2022    K 3.4 (L) 08/03/2022     08/03/2022    CO2 22 08/03/2022    BUN 10 08/03/2022    CREATININE 1.1 08/03/2022    GLUCOSE 113 (H) 08/03/2022    CALCIUM 9.5 08/03/2022    PROT 8.1 08/03/2022    LABALBU 4.0 08/03/2022    BILITOT 0.7 08/03/2022    ALKPHOS 85 08/03/2022    AST 21 08/03/2022    ALT 25 08/03/2022    LABGLOM >60 08/03/2022    GFRAA >59 08/03/2022       Lab Results   Component Value Date    INR 1.14 08/03/2022    PROTIME 14.6 08/03/2022       RADIOLOGY STUDIES REPORT/REVIEWED AND INTERPRETED BY ME:  XR CHEST PORTABLE    Result Date: 8/3/2022  Unremarkable chest Recommendation: Follow up as clinically indicated. Electronically Signed by Emigdio Bridges MD at 03-Aug-2022 10:58:01 AM             CTA PULMONARY W CONTRAST    Result Date: 8/3/2022  1. Extensive bilateral pulmonary emboli, more numerous in the lower lobes. 2. The lungs are grossly clear. Recommendation: Follow up as clinically indicated. All CT scans at this facility utilize dose modulation, iterative reconstruction, and/or weight based dosing when appropriate to reduce radiation dose to as low as reasonably achievable. Amended by Albert Granados MD at 03-Aug-2022 12:29:08 PM Electronically Signed by Albert Granados MD at 03-Aug-2022 12:23:24 PM              ASSESSMENT:  #Unprovoked DVT/PE  -Risk factors-none, likely unprovoked event  -Patient will need thrombophilia work-up  -CTA as above, US lower extreme positive DVT  -2D echo pending  -Troponin normal, proBNP normal  -PESI score @diagnosis        PLAN:  -Continue IV heparin for 24 hours  -Transition to low molecular weight heparin tomorrow  -We will likely to discharge home on LMWH x3 weeks and then switch him to DOACs  Antiphospholipid syndrome work-up  Thrombophilia work-up as outpatient  Social work consultation to help with discharge    I have seen, examined and reviewed this patient medication list, appropriate labs and imaging studies. I reviewed relevant medical records and others physicians notes. I discussed the plans of care with the patient. I answered all the questions to the patients satisfaction. I have also reviewed the chief complaint (CC) and part of the history (History of Present Illness (HPI), Past Family Social History [facility]), or Review of Systems (ROS) and made changes when appropriated.        (Please note that portions of this note were completed with a voice recognition program. Efforts were made to edit the dictations but occasionally words are mis-transcribed.)        Taz Lee MD    08/03/22  5:16 PM

## 2022-08-03 NOTE — CARE COORDINATION
Patient Contact Information:    [id number]  [phone] (home)   Telephone Information:   Mobile [phone]     Above information verified? [x]   Yes  []   No      Emergency Contacts:    Extended Emergency Contact Information  Primary Emergency Contact: allie lynch  Home Phone: [phone]  Relation: Brother/Sister   needed? No  Secondary Emergency Contact: [facility] Phone: [phone]  Relation: Girlfriend      Have you been vaccinated for COVID-19 (SARS-CoV-2)? []   Yes  [x]   No                   If so, when? Which :         []   Pfizer-BioNTech  []   Moderna  []   Galina Fresh  []   Other:         Pharmacy:    [address] [facility] Buck [address]  [id number] Sheryle Buck [address] [facility]  Phone: [phone] Fax: [phone], [address] [place] [address] Drive [phone] - F [phone]  [address] [place] [address]  Phone: [phone] Fax: [phone]          Patient Deficits:    []   Yes   [x]   No    If yes:    []   Confusion/Memory  []   Visual  []   Motor/Sensory         []   Right arm         []   Right leg         []   Left arm         []   Left leg  []   Language/Speech         []   Aphasia         []   Dysarthria         []   Swallow         Sheree Coma Scale  Eye Opening: Spontaneous  Best Verbal Response: Oriented  Best Motor Response: Obeys commands  Beason Coma Scale Score: 15    Patient Deficit Notes:

## 2022-08-03 NOTE — TELEPHONE ENCOUNTER
[facility] Inpatient Consult    Oralee Ben  1980    Shreya Duran  Nurse Clearance Jose Juan    [phone]    Dx: bilateral pulmonary embolus    Room: 099

## 2022-08-03 NOTE — PROGRESS NOTES
Vascular lab preliminary results. Bilateral lower extremity venous duplex scan performed. (+) DVT. Thrombus noted in Lt Pop V, B/L PTV, Lt Stuart and Lt Soleal veins. No evidence of SVT or reflux noted at this time. Informed  [facility] of preliminary results. Final report pending.

## 2022-08-03 NOTE — H&P
[facility] - History & Physical      PCP: No primary care provider on file. Date of Admission: 8/3/2022    Date of Service: 8/3/2022    Chief Complaint:  shortness of breath    History Of Present Illness: The patient is a 43 y.o. male with no significant past medical history who presented to [facility] ED complaining of shortness of breath. Patient reports over the past week having increasing shortness of breath. He reports yesterday he went up a flight of stairs and began having significant shortness of breath and chest pain. Patient reported he has had no recent injury or accidents. Patient denied history of blood clot/DVT. Patient denies family history of DVT. Patient reports approximately 1 week ago having a bad cramp in his left calf which resolved in a few hours. Patient denies any swelling or pain since in the left lower extremity. Patient denies sedentary lifestyle. Patient reports he smoked approximately 1 year total.  Patient denies IV drug use. Patient denies alcohol use. ED work-up indicated potassium 3.4 and D-dimer greater than 20. CTA chest was obtained and indicated extensive bilateral pulmonary emboli most numerous in the lower lobes. Venous duplex bilateral lower extremities obtained and indicated thrombus noted on preliminary results. Patient was placed on a heparin drip and vascular surgery was consulted from the ED. Patient admitted to the hospitalist service for bilateral pulmonary embolism. Past Medical History:        Diagnosis Date    Allergic rhinitis     Dislocation of right shoulder joint 2000    Laceration of left elbow 2012       Past Surgical History:    History reviewed. No pertinent surgical history. Home Medications:  Prior to Admission medications    Not on File       Allergies:    Morphine    Social History:    The patient currently lives at home  Tobacco:   reports that he quit smoking about 15 years ago.  His smoking use included cigarettes. He started smoking about 16 years ago. He smoked an average of .25 packs per day. He has never used smokeless tobacco.  Alcohol:   reports that he does not currently use alcohol. Illicit Drugs: denies    Family History:      Problem Relation Age of Onset    Cancer Mother 64        Bone Cancer    Diabetes Father     Diabetes Brother        Review of Systems:   Review of Systems   Constitutional:  Negative for chills and fever. HENT:  Negative for sore throat and trouble swallowing. Respiratory:  Positive for shortness of breath. Negative for cough, chest tightness and wheezing. Cardiovascular:  Positive for chest pain. Negative for palpitations and leg swelling. Gastrointestinal:  Negative for abdominal pain, blood in stool, diarrhea, nausea, rectal pain and vomiting. Genitourinary:  Negative for dysuria, hematuria and urgency. Musculoskeletal:  Positive for myalgias (Left calf approximately 1 week ago). Negative for back pain. Skin:  Negative for color change and wound. Neurological:  Negative for dizziness and headaches. Hematological:  Does not bruise/bleed easily. Psychiatric/Behavioral:  Negative for agitation. 14 point review of systems is negative except as specifically addressed above. Physical Examination:  BP (!) 145/112   Pulse 86   Temp 98.6 °F (37 °C)   Resp 16   Wt 256 lb (116.1 kg)   SpO2 94%   BMI 40.10 kg/m²   Physical Exam  Vitals reviewed. Constitutional:       Appearance: He is not ill-appearing. Cardiovascular:      Rate and Rhythm: Regular rhythm. Tachycardia present. Pulses: Normal pulses. Heart sounds: Normal heart sounds. Pulmonary:      Effort: Pulmonary effort is normal.      Breath sounds: Normal breath sounds. Abdominal:      General: Abdomen is flat. Bowel sounds are normal. There is no distension. Palpations: Abdomen is soft. Tenderness: There is no abdominal tenderness. There is no guarding. Musculoskeletal:         General: No tenderness. Normal range of motion. Right lower leg: No edema. Left lower leg: No edema. Skin:     Capillary Refill: Capillary refill takes less than 2 seconds. Neurological:      General: No focal deficit present. Mental Status: He is alert and oriented to person, place, and time. Diagnostic Data:  CBC:  Recent Labs     08/03/22  0835   WBC 10.8   HGB 15.8   HCT 46.8        BMP:  Recent Labs     08/03/22  0835      K 3.4*      CO2 22   BUN 10   CREATININE 1.1   CALCIUM 9.5     Recent Labs     08/03/22  0835   AST 21   ALT 25   BILITOT 0.7   ALKPHOS 85     Coag Panel:   Recent Labs     08/03/22  0835   APTT 30.2     Cardiac Enzymes:   Recent Labs     08/03/22  0835   TROPONINI 0.03     ABGs:No results found for: PHART, PO2ART, ODE2WBT  Urinalysis:No results found for: NITRU, WBCUA, BACTERIA, RBCUA, BLOODU, SPECGRAV, GLUCOSEU  A1C: No results for input(s): LABA1C in the last 72 hours. ABG:No results for input(s): PHART, BZL5HQZ, PO2ART, JSB3UXS, BEART, HGBAE, X5RXCOGH, CARBOXHGBART in the last 72 hours. XR CHEST PORTABLE    Result Date: 8/3/2022  NO PRIOR REPORT AVAILABLE Exam:X-RAY OF THE CHEST Clinical data: Shortness of breath. Technique: Single view of the chest. Prior studies: No prior studies submitted. Findings: SinglePA view of the chest was performed. Sandi Sutton is midline. The heart size is within normal limits. No infiltrate, effusion or pneumothorax. The soft tissues and bony structures demonstrate no acute pathology. Unremarkable chest Recommendation: Follow up as clinically indicated. Electronically Signed by Wyona Eisenmenger MD at 03-Aug-2022 10:58:01 AM             CTA PULMONARY W CONTRAST    Result Date: 8/3/2022  NO PRIOR REPORT AVAILABLE <Addendum Signed by Christian Hernández MD at 03-Aug-2022 12:29:08 PM Findings were communicated to Dr. Saint Clair on 08/3/22 at 12:28PM who confirms having received the report. No further action required by the reading radiologist. If the referring clinician has any further questions please call customer service [phone], option 1. Critical Documentation Completed (CEK) Addendum End> Exam: CTA OF THE CHEST WITH CONTRAST Clinical data: Shortness of breath, tachycardia and left-sided chest pain Rule out PE. Technique: Axial CT angiography images through the lungs with contrast and imaged using soft tissue and lung algorithms. Coronal, sagittal, and 3D volume reconstructions were performed. Reformatted/3D-MPR images were performed. Radiation dose: CTDIvol =49.26 mGy, DLP =1022.75 mGy x cm. Prior studies: Radiograph of the chest done on same day. Findings: Lungs: Minor streaky markings in the posterior lung bases, likely atelectasis or scar. The lungs are otherwise clear with no focal pulmonary consolidation or pleural effusion. No evidence of pneumothorax. Soft Tissues: No mediastinal, axillary or hilar adenopathy. Vascular: Multiple filling defects in the bilateral pulmonary arteries, more extensive to the lower lobes, compatible with extensive pulmonary emboli. bilateral distal and segmental pulmonary arteries. Unremarkable aorta. Grossly unremarkable sized heart. No definite abnormality seen on 3D reformatted images. Bony structures: Mild degenerative disc disease. No acute or destructive abnormality Upper Abdomen: Hypoattenuation throughout the liver, compatible with fatty infiltration. Limited visualization of the solid upper abdominal organs is otherwise grossly unremarkable. 1. Extensive bilateral pulmonary emboli, more numerous in the lower lobes. 2. The lungs are grossly clear. Recommendation: Follow up as clinically indicated. All CT scans at this facility utilize dose modulation, iterative reconstruction, and/or weight based dosing when appropriate to reduce radiation dose to as low as reasonably achievable.  Amended by Zoltan Verdin MD at 03-Aug-2022 12:29:08 PM Electronically Signed by Shana Dinero MD at 03-Aug-2022 12:23:24 PM               Assessment/Plan:  Principal Problem:    Pulmonary embolism, bilateral ([address]   -admit   -vascular surgery consulted from ED   -Heparin drip initiated in ED   -Hematology consult   -BLE venous duplex   -monitor CBC, BMP, and PTT   -Echo   -Bedrest   -monitor for need of supplemental oxygen  Resolved Problems:    * No resolved hospital problems.  *       Signed:  DINA Rivas - CNP, 8/3/2022 12:35 PM

## 2022-08-03 NOTE — CARE COORDINATION
08/03/22 1246   Service Assessment   Patient Orientation Alert and Oriented   Cognition Alert   History Provided By Patient   Primary Caregiver Self   Accompanied By/Relationship no one at bedside   Support Systems Family Members;Spouse/Significant Other   PCP Verified by CM   (States he would like to be set up with one)   Prior Functional Level Independent in ADLs/IADLs; Bathing; Toileting;Dressing;Feeding;Cooking;Housework; Shopping;Mobility   Current Functional Level Independent in ADLs/IADLs; Bathing;Dressing; Toileting;Feeding;Cooking;Housework; Shopping;Mobility   Can patient return to prior living arrangement Yes   Ability to make needs known: Good   Family able to assist with home care needs: Yes   Financial Resources   (Denied needs)   [employer]   (Denied needs)   CM/SW Referral No PCP   Social/Functional History   Lives With Significant other   Type of [facility] Standard   Bathroom Equipment   (Denied need / use of bathroom dme)   [address]   (denied need / use of home dme)   [facility] Responsibilities Yes   Ambulation Assistance Independent   Transfer Assistance Independent   Active  Yes   Mode of Transportation Car   Discharge Planning   Type of [address] Prior To Admission None   Potential Assistance Needed   (Wants to be set up with PCP)   DME Ordered?  No   Potential Assistance Purchasing Medications No   Type of Home Care Services None   Patient expects to be discharged to: [facility]

## 2022-08-03 NOTE — ED PROVIDER NOTES
[facility] EMERGENCY DEPT  eMERGENCY dEPARTMENT eNCOUnter      Pt Name: Venu Soto  MRN: [id number]  [place] 1980  Date of evaluation: 8/3/2022  Provider: Britt Razo MD    [address] [place] [address]       Chief Complaint   Patient presents with    Shortness of Breath     X 1 week getting worse           HISTORY OF PRESENT ILLNESS   (Location/Symptom, Timing/Onset,Context/Setting, Quality, Duration, Modifying Factors, Severity)  Note limiting factors. Venu Soto is a 43 y.o. male who presents to the emergency department evaluation of shortness of breath    42-year-old male presents with complaint of chest discomfort shortness of breath worse with exertion. This been going on for 2 to 3 days now. He does not smoke. He denies any infectious symptoms fever etc.  No history of DVT or PE. But when asked he remembers his left calf did hurt about a week ago he thought it was a charley horse. Not hurting now not swollen. The history is provided by the patient. NursingNotes were reviewed. REVIEW OF SYSTEMS    (2-9 systems for level 4, 10 or more for level 5)     Review of Systems   Constitutional:  Negative for chills and fever. HENT:  Negative for congestion, drooling, facial swelling, nosebleeds, sinus pressure, sore throat and voice change. Eyes:  Negative for discharge. Respiratory:  Positive for shortness of breath. Negative for apnea, cough and choking. Cardiovascular:  Positive for chest pain. Negative for palpitations and leg swelling. Gastrointestinal:  Negative for abdominal pain, blood in stool, constipation, diarrhea and nausea. Genitourinary:  Negative for dysuria and enuresis. Musculoskeletal:  Negative for joint swelling. Skin:  Negative for rash and wound. Neurological:  Negative for seizures and syncope. Psychiatric/Behavioral:  Negative for behavioral problems, hallucinations and suicidal ideas. All other systems reviewed and are negative.     A complete review of systems was performed and is negative except as noted above in the HPI. PAST MEDICAL HISTORY     Past Medical History:   Diagnosis Date    Allergic rhinitis     Dislocation of right shoulder joint 2000    Laceration of left elbow 2012         SURGICAL HISTORY     No past surgical history on file. CURRENT MEDICATIONS       Previous Medications    No medications on file       ALLERGIES     Morphine    FAMILY HISTORY       Family History   Problem Relation Age of Onset    Cancer Mother 64        Bone Cancer    Diabetes Father     Diabetes Brother           SOCIAL HISTORY       Social History     Socioeconomic History    Marital status: Single   Tobacco Use    Smoking status: Former     Packs/day: 0.25     Types: Cigarettes     Start date: 01/2006     Quit date: 12/2006     Years since quitting: 15.6    Smokeless tobacco: Never   Vaping Use    Vaping Use: Never used   Substance and Sexual Activity    Alcohol use: Not Currently    Drug use: Never       SCREENINGS    Sheree Coma Scale  Eye Opening: Spontaneous  Best Verbal Response: Oriented  Best Motor Response: Obeys commands  [place] Coma Scale Score: 15        PHYSICAL EXAM    (up to 7 for level 4, 8 or more for level 5)     ED Triage Vitals [08/03/22 0827]   BP Temp Temp src Heart Rate Resp SpO2 Height Weight   (!) 155/105 98.6 °F (37 °C) -- 86 16 94 % -- --       Physical Exam  Vitals and nursing note reviewed. Constitutional:       Appearance: He is well-developed. HENT:      Head: Normocephalic and atraumatic. Right Ear: External ear normal.      Left Ear: External ear normal.      Mouth/Throat:      Pharynx: Oropharynx is clear. Eyes:      General: No scleral icterus. Conjunctiva/sclera: Conjunctivae normal.      Pupils: Pupils are equal, round, and reactive to light. Cardiovascular:      Rate and Rhythm: Regular rhythm. Tachycardia present. Pulses: Normal pulses. Heart sounds: Normal heart sounds.  No murmur heard.  Pulmonary:      Effort: Pulmonary effort is normal. No respiratory distress. Breath sounds: Normal breath sounds. Abdominal:      General: Bowel sounds are normal.      Palpations: Abdomen is soft. Musculoskeletal:         General: No swelling. Normal range of motion. Cervical back: Normal range of motion and neck supple. Right lower leg: No edema. Left lower leg: No edema. Skin:     General: Skin is warm and dry. Neurological:      General: No focal deficit present. Mental Status: He is alert and oriented to person, place, and time. Psychiatric:         Mood and Affect: Mood normal.         Behavior: Behavior normal.       DIAGNOSTIC RESULTS     EKG: All EKG's are interpreted by the Emergency Department Physician who either signs or Co-signs this chart in the absence of a cardiologist.    Sinus tachycardia rate 100. IL interval 142. QTc 430. No ST abnormality. T waves are flat. RADIOLOGY:   Non-plain film images such as CT, Ultrasound and MRI are read by the radiologist. Plainradiographic images are visualized and preliminarily interpreted by the emergency physician with the below findings:    I have reviewed images and results. Interpretation per the Radiologist below, if available at the time of this note:    CTA PULMONARY W CONTRAST   Final Result   1. Extensive bilateral pulmonary emboli, more numerous in the lower lobes. 2. The lungs are grossly clear. Recommendation: Follow up as clinically indicated. All CT scans at this facility utilize dose modulation, iterative reconstruction, and/or weight based dosing when appropriate to reduce radiation dose to as low as reasonably achievable. Amended by Bernardo Berg MD at 03-Aug-2022 12:29:08 PM   Electronically Signed by Bernardo Berg MD at 03-Aug-2022 12:23:24 PM               XR CHEST PORTABLE   Final Result   Unremarkable chest   Recommendation:    Follow up as clinically indicated.    Electronically Signed by Africa Mitchell MD at 03-Aug-2022 10:58:01 AM                     ED BEDSIDE ULTRASOUND:   Performed by ED Physician - none    LABS:  Labs Reviewed   CBC WITH AUTO DIFFERENTIAL - Abnormal; Notable for the following components:       Result Value    MCH 31.6 (*)     Neutrophils % 72.0 (*)     Lymphocytes % 14.4 (*)     Neutrophils Absolute 7.8 (*)     Monocytes Absolute 1.10 (*)     All other components within normal limits   COMPREHENSIVE METABOLIC PANEL W/ REFLEX TO MG FOR LOW K - Abnormal; Notable for the following components:    Potassium reflex Magnesium 3.4 (*)     Glucose 113 (*)     All other components within normal limits   D-DIMER, QUANTITATIVE - Abnormal; Notable for the following components:    D-Dimer, Quant >20.00 (*)     All other components within normal limits   COVID-19, RAPID   APTT   BRAIN NATRIURETIC PEPTIDE   TROPONIN   MAGNESIUM   APTT   APTT   PROTIME-INR   APTT       All other labs were within normal range or not returned as of this dictation. EMERGENCY DEPARTMENT COURSE and DIFFERENTIALDIAGNOSIS/MDM:   Vitals:    Vitals:    08/03/22 0827   BP: (!) 155/105   Pulse: 86   Resp: 16   Temp: 98.6 °F (37 °C)   SpO2: 94%       MDM  Number of Diagnoses or Management Options  Bilateral pulmonary embolism (HCC)  Diagnosis management comments: Patient has tachycardia. Sats of 89 on room air. I am still waiting on the D-dimer to result but I am going to go ahead and order the CTA. For his chest looks clear on x-ray. 12 PM.  CT shows extensive bilateral pulmonary emboli. Abdomen start heparin drip Dr. The hospitalist service for admission and consult vascular surgery. CONSULTS:  IP CONSULT TO VASCULAR SURGERY    PROCEDURES:  Unless otherwise notedbelow, none     Procedures    FINAL IMPRESSION     1.  Bilateral pulmonary embolism ([address]          DISPOSITION/PLAN   DISPOSITION Decision To Admit 08/03/2022 12:00:09 PM      PATIENT REFERRED TO:  @FUP@    DISCHARGE

## 2022-08-04 PROBLEM — I82.433 ACUTE BILATERAL DEEP VEIN THROMBOSIS (DVT) OF POPLITEAL VEINS (HCC): Status: ACTIVE | Noted: 2022-08-04

## 2022-08-04 LAB
ANION GAP SERPL CALCULATED.3IONS-SCNC: 13 MMOL/L (ref 7–19)
APTT: 64.9 SEC (ref 26–36.2)
APTT: 76.8 SEC (ref 26–36.2)
APTT: 79.7 SEC (ref 26–36.2)
BASOPHILS ABSOLUTE: 0 K/UL (ref 0–0.2)
BASOPHILS RELATIVE PERCENT: 0.4 % (ref 0–1)
BUN BLDV-MCNC: 9 MG/DL (ref 6–20)
CALCIUM SERPL-MCNC: 9 MG/DL (ref 8.6–10)
CHLORIDE BLD-SCNC: 106 MMOL/L (ref 98–111)
CO2: 22 MMOL/L (ref 22–29)
CREAT SERPL-MCNC: 1.1 MG/DL (ref 0.5–1.2)
EKG P AXIS: 21 DEGREES
EKG P-R INTERVAL: 110 MS
EKG Q-T INTERVAL: 332 MS
EKG QRS DURATION: 80 MS
EKG QTC CALCULATION (BAZETT): 400 MS
EKG T AXIS: -11 DEGREES
EOSINOPHILS ABSOLUTE: 0.3 K/UL (ref 0–0.6)
EOSINOPHILS RELATIVE PERCENT: 2.7 % (ref 0–5)
GFR AFRICAN AMERICAN: >59
GFR NON-AFRICAN AMERICAN: >60
GLUCOSE BLD-MCNC: 113 MG/DL (ref 74–109)
HCT VFR BLD CALC: 45.9 % (ref 42–52)
HEMOGLOBIN: 15.5 G/DL (ref 14–18)
IMMATURE GRANULOCYTES #: 0 K/UL
LYMPHOCYTES ABSOLUTE: 2.2 K/UL (ref 1.1–4.5)
LYMPHOCYTES RELATIVE PERCENT: 20.3 % (ref 20–40)
MAGNESIUM: 2.1 MG/DL (ref 1.6–2.6)
MCH RBC QN AUTO: 31.5 PG (ref 27–31)
MCHC RBC AUTO-ENTMCNC: 33.8 G/DL (ref 33–37)
MCV RBC AUTO: 93.3 FL (ref 80–94)
MONOCYTES ABSOLUTE: 1 K/UL (ref 0–0.9)
MONOCYTES RELATIVE PERCENT: 9.5 % (ref 0–10)
NEUTROPHILS ABSOLUTE: 7.1 K/UL (ref 1.5–7.5)
NEUTROPHILS RELATIVE PERCENT: 66.7 % (ref 50–65)
PDW BLD-RTO: 12.9 % (ref 11.5–14.5)
PLATELET # BLD: 216 K/UL (ref 130–400)
PMV BLD AUTO: 10.1 FL (ref 9.4–12.4)
POTASSIUM REFLEX MAGNESIUM: 3.3 MMOL/L (ref 3.5–5)
RBC # BLD: 4.92 M/UL (ref 4.7–6.1)
SODIUM BLD-SCNC: 141 MMOL/L (ref 136–145)
WBC # BLD: 10.6 K/UL (ref 4.8–10.8)

## 2022-08-04 PROCEDURE — 36415 COLL VENOUS BLD VENIPUNCTURE: CPT

## 2022-08-04 PROCEDURE — 83735 ASSAY OF MAGNESIUM: CPT

## 2022-08-04 PROCEDURE — 85610 PROTHROMBIN TIME: CPT

## 2022-08-04 PROCEDURE — 86147 CARDIOLIPIN ANTIBODY EA IG: CPT

## 2022-08-04 PROCEDURE — 86146 BETA-2 GLYCOPROTEIN ANTIBODY: CPT

## 2022-08-04 PROCEDURE — 6370000000 HC RX 637 (ALT 250 FOR IP): Performed by: HOSPITALIST

## 2022-08-04 PROCEDURE — 6360000002 HC RX W HCPCS: Performed by: INTERNAL MEDICINE

## 2022-08-04 PROCEDURE — 6370000000 HC RX 637 (ALT 250 FOR IP): Performed by: NURSE PRACTITIONER

## 2022-08-04 PROCEDURE — 93010 ELECTROCARDIOGRAM REPORT: CPT | Performed by: INTERNAL MEDICINE

## 2022-08-04 PROCEDURE — 2580000003 HC RX 258: Performed by: NURSE PRACTITIONER

## 2022-08-04 PROCEDURE — 85635 REPTILASE TEST: CPT

## 2022-08-04 PROCEDURE — 1210000000 HC MED SURG R&B

## 2022-08-04 PROCEDURE — 85670 THROMBIN TIME PLASMA: CPT

## 2022-08-04 PROCEDURE — 85025 COMPLETE CBC W/AUTO DIFF WBC: CPT

## 2022-08-04 PROCEDURE — 85613 RUSSELL VIPER VENOM DILUTED: CPT

## 2022-08-04 PROCEDURE — 99232 SBSQ HOSP IP/OBS MODERATE 35: CPT | Performed by: INTERNAL MEDICINE

## 2022-08-04 PROCEDURE — 80048 BASIC METABOLIC PNL TOTAL CA: CPT

## 2022-08-04 PROCEDURE — 6370000000 HC RX 637 (ALT 250 FOR IP): Performed by: INTERNAL MEDICINE

## 2022-08-04 PROCEDURE — 85730 THROMBOPLASTIN TIME PARTIAL: CPT

## 2022-08-04 PROCEDURE — 6360000002 HC RX W HCPCS: Performed by: NURSE PRACTITIONER

## 2022-08-04 RX ORDER — ENOXAPARIN SODIUM 150 MG/ML
1 INJECTION SUBCUTANEOUS EVERY 12 HOURS
Status: DISCONTINUED | OUTPATIENT
Start: 2022-08-04 | End: 2022-08-05 | Stop reason: HOSPADM

## 2022-08-04 RX ORDER — NAPROXEN 500 MG/1
500 TABLET ORAL DAILY
Status: DISCONTINUED | OUTPATIENT
Start: 2022-08-04 | End: 2022-08-05 | Stop reason: HOSPADM

## 2022-08-04 RX ORDER — POTASSIUM CHLORIDE 7.45 MG/ML
10 INJECTION INTRAVENOUS PRN
Status: DISCONTINUED | OUTPATIENT
Start: 2022-08-04 | End: 2022-08-05 | Stop reason: HOSPADM

## 2022-08-04 RX ORDER — CETIRIZINE HYDROCHLORIDE 10 MG/1
10 TABLET ORAL DAILY
Status: DISCONTINUED | OUTPATIENT
Start: 2022-08-04 | End: 2022-08-05 | Stop reason: HOSPADM

## 2022-08-04 RX ORDER — IODINE/SODIUM IODIDE 2 %
TINCTURE TOPICAL PRN
Status: DISCONTINUED | OUTPATIENT
Start: 2022-08-04 | End: 2022-08-05 | Stop reason: HOSPADM

## 2022-08-04 RX ORDER — POTASSIUM CHLORIDE 20 MEQ/1
40 TABLET, EXTENDED RELEASE ORAL PRN
Status: DISCONTINUED | OUTPATIENT
Start: 2022-08-04 | End: 2022-08-05 | Stop reason: HOSPADM

## 2022-08-04 RX ADMIN — POTASSIUM CHLORIDE 40 MEQ: 20 TABLET, EXTENDED RELEASE ORAL at 09:32

## 2022-08-04 RX ADMIN — SODIUM CHLORIDE, PRESERVATIVE FREE 10 ML: 5 INJECTION INTRAVENOUS at 20:12

## 2022-08-04 RX ADMIN — CETIRIZINE HYDROCHLORIDE 10 MG: 10 TABLET ORAL at 11:22

## 2022-08-04 RX ADMIN — NAPROXEN 500 MG: 500 TABLET ORAL at 11:22

## 2022-08-04 RX ADMIN — ENOXAPARIN SODIUM 105 MG: 150 INJECTION SUBCUTANEOUS at 17:36

## 2022-08-04 RX ADMIN — HEPARIN SODIUM 14 UNITS/KG/HR: 10000 INJECTION, SOLUTION INTRAVENOUS at 04:37

## 2022-08-04 ASSESSMENT — ENCOUNTER SYMPTOMS
RECTAL PAIN: 0
BLOOD IN STOOL: 0
COUGH: 0
TROUBLE SWALLOWING: 0
CHEST TIGHTNESS: 0
ABDOMINAL PAIN: 0
SORE THROAT: 0
VOMITING: 0
DIARRHEA: 0
BACK PAIN: 0
NAUSEA: 0
COLOR CHANGE: 0
WHEEZING: 0
SHORTNESS OF BREATH: 1

## 2022-08-04 ASSESSMENT — PAIN SCALES - GENERAL
PAINLEVEL_OUTOF10: 0
PAINLEVEL_OUTOF10: 0

## 2022-08-04 NOTE — PROGRESS NOTES
[facility] Hospitalists      Patient:  Sundar Willis  YOB: 1980  Date of Service: 8/4/2022  MRN: [id number]   Acct: [de-identified]   Primary Care Physician: No primary care provider on file. Advance Directive: Full Code  Admit Date: 8/3/2022       Hospital Day: 1  Portions of this note have been copied forward, however, changed to reflect the most current clinical status of this patient. CHIEF COMPLAINT shortness of breath    SUBJECTIVE: Feeling much better today. Denies lower extremity pain. Reports he was able to get up and get a shower without significant shortness of breath today. Patient reports being able to ambulate in the [place]quez and having some shortness of breath but reports it was \" not as bad as it has been\"    Estefani 10:  The patient is a 43 y.o. male with no significant past medical history who presented to [facility] ED complaining of shortness of breath. Patient reported over the past week having increasing shortness of breath. He reported day before admission he went up a flight of stairs and began having significant shortness of breath and chest pain. Patient reported he has had no recent injury or accidents. Patient denied history of blood clot/DVT. Patient denied family history of DVT at admission however after further investigation found his cousin has a clotting disorder. Patient reported approximately 1 week ago having a bad cramp in his left calf which resolved in a few hours. Patient denied any swelling or pain since in the left lower extremity. Patient denied sedentary lifestyle. Patient reported he smoked approximately 1 year total, many years ago. Patient denied IV drug use. Patient denied alcohol use. ED work-up indicated potassium 3.4 and D-dimer greater than 20. CTA chest was obtained and indicated extensive bilateral pulmonary emboli most numerous in the lower lobes.   Venous duplex bilateral lower extremities obtained and indicated thrombus noted on Normal rate and regular rhythm. Pulses: Normal pulses. Heart sounds: Normal heart sounds. Pulmonary:      Effort: Pulmonary effort is normal.      Breath sounds: Normal breath sounds. Abdominal:      General: Abdomen is flat. Bowel sounds are normal. There is no distension. Palpations: Abdomen is soft. Tenderness: There is no abdominal tenderness. There is no guarding. Musculoskeletal:         General: No tenderness. Normal range of motion. Right lower leg: No edema. Left lower leg: No edema. Skin:     Capillary Refill: Capillary refill takes less than 2 seconds. Neurological:      General: No focal deficit present. Mental Status: He is alert and oriented to person, place, and time. Medications:      sodium chloride        naproxen  500 mg Oral Daily    cetirizine  10 mg Oral Daily    enoxaparin  1 mg/kg SubCUTAneous BID    sodium chloride flush  5-40 mL IntraVENous 2 times per day     Calamine, potassium chloride **OR** potassium alternative oral replacement **OR** potassium chloride, heparin (porcine), heparin (porcine), sodium chloride flush, sodium chloride, ondansetron **OR** ondansetron, polyethylene glycol, acetaminophen **OR** acetaminophen  ADULT DIET; Regular     Lab and other Data:     Recent Labs     08/03/22  0835 08/04/22  0008   WBC 10.8 10.6   HGB 15.8 15.5    216     Recent Labs     08/03/22  0835 08/04/22  0008    141   K 3.4* 3.3*    106   CO2 22 22   BUN 10 9   CREATININE 1.1 1.1   GLUCOSE 113* 113*     Recent Labs     08/03/22  0835   AST 21   ALT 25   BILITOT 0.7   ALKPHOS 85     Troponin T:   Recent Labs     08/03/22  0835 08/03/22  1802 08/03/22  2141   TROPONINI 0.03 0.04* 0.03     Pro-BNP: No results for input(s): BNP in the last 72 hours.   INR:   Recent Labs     08/03/22  0835   INR 1.14     UA:No results for input(s): NITRITE, COLORU, PHUR, LABCAST, WBCUA, RBCUA, MUCUS, TRICHOMONAS, YEAST, BACTERIA, CLARITYU, Torie Buenrostrot, UROBILINOGEN, BILIRUBINUR, BLOODU, GLUCOSEU, AMORPHOUS in the last 72 hours. Invalid input(s): Terra [place]  A1C: No results for input(s): LABA1C in the last 72 hours. ABG:No results for input(s): PHART, YUI0YMD, PO2ART, GZQ5TXH, BEART, HGBAE, F1AIRNCB, CARBOXHGBART in the last 72 hours. RAD:   XR CHEST PORTABLE    Result Date: 8/3/2022  Unremarkable chest Recommendation: Follow up as clinically indicated. Electronically Signed by Emigdio Bridges MD at 03-Aug-2022 10:58:01 AM             CTA PULMONARY W CONTRAST    Result Date: 8/3/2022  1. Extensive bilateral pulmonary emboli, more numerous in the lower lobes. 2. The lungs are grossly clear. Recommendation: Follow up as clinically indicated. All CT scans at this facility utilize dose modulation, iterative reconstruction, and/or weight based dosing when appropriate to reduce radiation dose to as low as reasonably achievable. Amended by Albert Granados MD at 03-Aug-2022 12:29:08 PM Electronically Signed by Albert Granados MD at 03-Aug-2022 12:23:24 PM                Echo:   Summary   Normal left ventricular size with preserved LV function and an estimated   ejection fraction of approximately 55-60%. Abnormal (paradoxical) motion   consistent with right ventricular volume overload and/or elevated RV   end-diastolic pressure. Normal diastolic filling pattern for age. Dilated right ventricular size with reduced RV function. No significant valvular stenosis or regurgitation. No evidence of significant pericardial effusion is noted. Aortic root and ascending aorta are within normal limits. The rhythm is sinus.       Assessment/Plan   Principal Problem:    Pulmonary embolism, bilateral ([address] / Bilateral pulmonary embolism (HCC) /Acute bilateral deep vein thrombosis (DVT) of popliteal veins (HCC)    -vascular surgery consulted from ED    -recommends no surgical intervention at this time              -Hematology consult  -Heparin drip initiated in ED, transition to Lovenox tonight per hematology recommendations               -Will need 3 week Lovenox at DC    -f/u in 2 weeks              -BLE venous duplex indicated acute bilateral DVT of the popliteal veins              -monitor CBC, BMP, and PTT              -Echo indicated preserved EF of 55 to 60%, paradoxical motion consistent with right ventricular volume overload and/or elevated RV end-diastolic pressure, dilated right ventricular size with reduced RV function              -Up as tolerated              -monitor for need of supplemental oxygen      DVT Prophylaxis: Transition to therapeutic Lovenox      DINA Calles - CNP, 8/4/2022 2:51 PM

## 2022-08-04 NOTE — CONSULTS
Vascular Surgery Consultation    I was consulted to see the patient for bilateral Deep Vein Thrombosis (DVT). HPI    This is a new patient to me. He is a 43year old  [place] man admitted and found to have bilateral pulmonary embolism and later found to have DVT as well. About a week ago, he noticed some discomfort in the left leg but no significant swelling. He thought he just pulled a muscle. The pain seemed to worsen over the last couple days before coming to the hospital he noticed that he was also short of breath with ambulation. He did not really notice any chest pain. He does not have a personal history of hypercoagulability. He does have a family history of hypercoagulability (one of his first cousins had blood clotting problems, he does not know the exact clotting disorder). Risk factors for hypercoagulable state include: obesity. He was admitted to the hospitalist and placed on intravenous heparin with therapeutic PTT level. He states that he feels some better today. Although, he is only walked from his bed to the bathroom.     Current Medical History    Monika Christine is a 43 y.o. male with the following history reviewed and recorded in [facility]:  Patient Active Problem List    Diagnosis Date Noted    Pulmonary embolism, bilateral ([facility] 08/03/2022     Priority: Medium    Bilateral pulmonary embolism ([facility] [facility] 08/03/2022     Priority: Medium     Current Facility-Administered Medications   Medication Dose Route Frequency Provider Last Rate Last Admin    naproxen (NAPROSYN) tablet 500 mg  500 mg Oral Daily Joanne Olmedo MD        Calamine 8-8 % lotion   Topical PRN Joanne Olmedo MD        potassium chloride (KLOR-CON M) extended release tablet 40 mEq  40 mEq Oral PRN DINA Burch CNP        Or    potassium bicarb-citric acid (EFFER-K) effervescent tablet 40 mEq  40 mEq Oral PRN DINA Burch CNP        Or    potassium chloride 10 mEq/100 mL IVPB (Peripheral Line)  10 mEq IntraVENous PRN Cathlean Katos, APRN - CNP        heparin (porcine) injection 9,290 Units  80 Units/kg IntraVENous PRN Cathlean Katos, APRN - CNP        heparin (porcine) injection 4,640 Units  40 Units/kg IntraVENous PRN Cathlean Katos, APRN - CNP        heparin 25,000 units in dextrose 5% 250 mL (premix) infusion  5-30 Units/kg/hr IntraVENous Continuous Cathlean Katos, APRN - CNP 16.3 mL/hr at 08/04/22 0437 14 Units/kg/hr at 08/04/22 0437    sodium chloride flush 0.9 % injection 5-40 mL  5-40 mL IntraVENous 2 times per day Cathlean Katos, APRN - CNP        sodium chloride flush 0.9 % injection 5-40 mL  5-40 mL IntraVENous PRN Cathlean Katos, APRN - CNP        0.9 % sodium chloride infusion   IntraVENous PRN Cathlean Katos, APRN - CNP        ondansetron (ZOFRAN-ODT) disintegrating tablet 4 mg  4 mg Oral Q8H PRN Cathlean Katos, APRN - CNP        Or    ondansetron (ZOFRAN) injection 4 mg  4 mg IntraVENous Q6H PRN Cathlean Katos, APRN - CNP        polyethylene glycol (GLYCOLAX) packet 17 g  17 g Oral Daily PRN Cathlean Katos, APRN - CNP        acetaminophen (TYLENOL) tablet 650 mg  650 mg Oral Q6H PRN Cathlean Katos, APRN - CNP        Or    acetaminophen (TYLENOL) suppository 650 mg  650 mg Rectal Q6H PRN Cathlean Katos, APRN - CNP        perflutren lipid microspheres (DEFINITY) injection 1.65 mg  1.5 mL IntraVENous ONCE PRN Davey Sheets MD   1.5 mL at 08/03/22 1423     Allergies: Morphine  Past Medical History:   Diagnosis Date    Allergic rhinitis     Dislocation of right shoulder joint 2000    Laceration of left elbow 2012     History reviewed. No pertinent surgical history.   Family History   Problem Relation Age of Onset    Cancer Mother 64        Bone Cancer    Diabetes Father     Diabetes Brother      Social History     Tobacco Use    Smoking status: Former     Packs/day: 0.25     Types: Cigarettes     Start date: 01/2006     Quit date: 12/2006 Years since quitting: 15.6    Smokeless tobacco: Never   Substance Use Topics    Alcohol use: Not Currently       Review of Systems    See H&P  See HPI all other review of systems are negative. Physical Exam    Constitutional - well developed, well nourished, moderately obese. No diaphoresis or acute distress. Cardiovascular - Regular rate and rhythm. No jugular venous distention  Extremities -normal pedal pulses bilaterally. No significant edema bilateral lower extremities  Pulmonary - no labored breathing. no respiratory distress. GI - Abdomen - soft, non tender, bowel sounds X 4 quadrants. No guarding or rebound tenderness. No distension or palpable mass. Neurologic - alert and oriented X 3. Physiologic. Skin - warm, dry, and intact. No rash, erythema, or pallor. Psychiatric - mood, affect, and behavior appear normal.  Judgment and thought processes appear normal.    Radiology/Vascular lab tests:    Vascular Lower Extremities DVT Study Procedure        Demographics        Patient Name     Alphonso Pozo Age                   43        Patient Number   [id number]          Gender                Male        Visit Number     [id number]       Interpreting          Liz Osei MD                                     Physician        Date of Birth    1980      Referring Physician   Susan Roberts        Accession Number [id number]      [facility] Mar[facility]       Procedure   Type of Study:        Veins:Lower Extremities DVT Study, VL LOWER EXTREMITY BILATERAL VENOUS    DUPLEX . Indications for Study:Pulmonary embolism. Impression        There is evidence of subacute deep vein thrombosis in the right lower    extremity involving the posterior tibial veins. There is evidence of subacute deep vein thrombosis in the left lower    extremity involving the popliteal, peroneal, posterior tibial, and soleal    veins.     There is no evidence of superficial thrombophlebitis of the bilateral    lower extremities. NO PRIOR REPORT AVAILABLE   <Addendum Signed by Christian Hernández MD at 03-Aug-2022 12:29:08 PM   Findings were communicated to Dr. LEONG [facility] on 08/3/22 at 12:28PM who confirms having received the report. No further action required by the reading radiologist. If the referring clinician has any further questions please call customer service    [phone], option 1. Critical Documentation Completed (CEK)   Addendum End>   Exam: CTA OF THE CHEST WITH CONTRAST   Clinical data: Shortness of breath, tachycardia and left-sided chest pain Rule out PE. Technique: Axial CT angiography images through the lungs with contrast and imaged using soft tissue and lung algorithms. Coronal, sagittal, and 3D volume reconstructions were performed. Reformatted/3D-MPR images were performed. Radiation dose: CTDIvol    =49.26 mGy, DLP =1022.75 mGy x cm. Prior studies: Radiograph of the chest done on same day. Findings: Lungs: Minor streaky markings in the posterior lung bases, likely atelectasis or scar. The lungs are otherwise clear with no focal pulmonary consolidation or pleural effusion. No evidence of pneumothorax. Soft Tissues: No mediastinal, axillary or hilar adenopathy. Vascular: Multiple filling defects in the bilateral pulmonary arteries, more extensive to the lower lobes, compatible with extensive pulmonary emboli. bilateral distal and segmental pulmonary arteries. Unremarkable aorta. Grossly unremarkable sized    heart. No definite abnormality seen on 3D reformatted images. Bony structures: Mild degenerative disc disease. No acute or destructive abnormality   Upper Abdomen: Hypoattenuation throughout the liver, compatible with fatty infiltration. Limited visualization of the solid upper abdominal organs is otherwise grossly unremarkable. Impression   1. Extensive bilateral pulmonary emboli, more numerous in the lower lobes.    2. The lungs are grossly clear.   Recommendation: Follow up as clinically indicated. All CT scans at this facility utilize dose modulation, iterative reconstruction, and/or weight based dosing when appropriate to reduce radiation dose to as low as reasonably achievable. Amended by Connie Stewart MD at 03-Aug-2022 12:29:08 PM   Electronically Signed by Connie Stewart MD at 03-Aug-2022 12:23:24 PM                Labs    Troponin- 0.03, 0.04, 0.03    Assessment    1. Bilateral pulmonary embolism ([address]        He has bilateral lower extremity Deep Vein Thrombosis. There is not a contraindication for anticoagulation currently. There is not a large floating tail that is high risk for potentially fatal pulmonary embolism. He does have bilateral pulmonary embolism as well. There was some evidence on echocardiography of right ventricular dilatation. He only had 1 troponin out of 3 which was slightly elevated 0.04. He is feeling better today after heparin started although he has only walked to the bathroom. Plan    I would get a hematology/oncology consultation. He basically had an unprovoked fairly significant bilateral lower extremity DVT with fairly extensive bilateral pulmonary embolism. He needs to be on anticoagulation for at least 1 year. I want him to walk in the hallway with the nurses. If he has significant shortness of breath with ambulation, I would recommend suction thrombectomy bilateral pulmonary thrombus/embolus in the operating room with via right femoral vein approach. I would only place an inferior vena cava (IVC) filter if the patient develops a contraindication to anticoagulation and has to stop anticoagulation or fails anticoagulation. I discussed this with the patient and/or their family members. I discussed with the patient and/or family the importance of follow-up with my office for  venous duplex scans.   They should call for increased swelling, shortness of breath, chest pain or any bleeding problems.

## 2022-08-04 NOTE — DISCHARGE INSTRUCTIONS
Patient instructed to keep leg elevated as much as possible due to the increased swelling that is associated with DVT. Call the office if pain, swelling, and tenderness extends beyond where it is today. Wear support hose (20-30mmHg compression) every day from the time they get up in the morning until they go to be at night. Use warm moist heat for comfort if needed. Walking often is good. If you are not eating, walking, bathing, or on commode, ELEVATE YOUR LEGS. Elevating back in a recliner, on a sofa, or bed are most beneficial because legs need to be near heart level to help reduce swelling.

## 2022-08-04 NOTE — PROGRESS NOTES
He walked in hallway without chest pain or shortness of breath. Oxygen sat in bed 93%, after walking 91% on room air. I will sign off for now. No intervention needed at this time. Have the patient f/u with me in 6 weeks for venous duplex.

## 2022-08-04 NOTE — PROGRESS NOTES
MEDICAL ONCOLOGY PROGRESS NOTE    Pt Name: Leda Hughes  MRN: [id number]  YOB: 1980  Date of evaluation: 8/4/2022      Subjective-no new complaint this morning. He has been seen by vascular. HISTORY OF PRESENT ILLNESS:    Diagnosis  Unprovoked DVT/PE    Treatment summary  IV heparin  8/4/2022-transition to LMWH 1 mg/kg every 12 hours if no vascular intervention  Anticipation of switching to oral anticoagulant in 3 weeks    Hematology history  Shani Newman was first seen by me on 8/3/2022. The patient is a 43years old male who presented ER department with complaints of acute onset chest discomfort short of breath with exertion. Short of breath started 3 days ago's. Patient denies any prior history of DVT or PE. No family history. Patient recalled that he had leg cramping about a week ago. 8/3/2022-chest x-ray was unremarkable  8/3/2022-CTA showed extensive bilateral pulm emboli, more numerous in the lower lobes. The lungs are grossly clear. 8/3/2020-US lower extremity bilaterally showed positive DVT in Lt Pop V, B/L PTV, Lt Stuart and Lt Soleal veins. No evidence of SVT or reflux noted at this time. 8/3/2022-2D echo showed preserved EF with RV dysfunction    Past Medical History:    Past Medical History:   Diagnosis Date    Allergic rhinitis     Dislocation of right shoulder joint 2000    Laceration of left elbow 2012       Past Surgical History:    Dislocation right shoulder, 2000  Laceration left elbow, 2012    Social History:    Smoking status: Quit 15 years ago.   ETOH status: No  Resides: [place], [place]    Family History:   Family History   Problem Relation Age of Onset    Cancer Mother 64        Bone Cancer    Diabetes Father     Diabetes Brother        Current Hospital Medications:    Current Facility-Administered Medications   Medication Dose Route Frequency Provider Last Rate Last Admin    heparin (porcine) injection 9,290 Units  80 Units/kg IntraVENous PRN Lorena Later, APRN - CNP        heparin (porcine) injection 4,640 Units  40 Units/kg IntraVENous PRN DINA Mullen CNP        heparin 25,000 units in dextrose 5% 250 mL (premix) infusion  5-30 Units/kg/hr IntraVENous Continuous DINA Johnston CNP 16.3 mL/hr at 08/04/22 0437 14 Units/kg/hr at 08/04/22 0437    sodium chloride flush 0.9 % injection 5-40 mL  5-40 mL IntraVENous 2 times per day DINA Johnston CNP        sodium chloride flush 0.9 % injection 5-40 mL  5-40 mL IntraVENous PRN DINA Johnston CNP        0.9 % sodium chloride infusion   IntraVENous PRN DINA Johnston CNP        ondansetron (ZOFRAN-ODT) disintegrating tablet 4 mg  4 mg Oral Q8H PRN DINA Johnston CNP        Or    ondansetron (ZOFRAN) injection 4 mg  4 mg IntraVENous Q6H PRN DINA Johnston CNP        polyethylene glycol (GLYCOLAX) packet 17 g  17 g Oral Daily PRN DINA Johnston CNP        acetaminophen (TYLENOL) tablet 650 mg  650 mg Oral Q6H PRN DINA Johnston CNP        Or    acetaminophen (TYLENOL) suppository 650 mg  650 mg Rectal Q6H PRN DINA Johnston CNP        perflutren lipid microspheres (DEFINITY) injection 1.65 mg  1.5 mL IntraVENous ONCE PRN Perri Sanchez MD   1.5 mL at 08/03/22 1423       Allergies:    Allergies   Allergen Reactions    Morphine Anaphylaxis         Subjective   REVIEW OF SYSTEMS:   CONSTITUTIONAL: no fever, no night sweats, fatigue;  HEENT: no blurring of vision, no double vision, no hearing difficulty, no tinnitus, no ulceration, no epistaxis;  LUNGS: no cough, no hemoptysis, no wheeze,  shortness of breath;  CARDIOVASCULAR: no palpitation,  chest pain,  shortness of breath;  GI: no abdominal pain, no nausea, no vomiting, no diarrhea, no constipation;  SAIMA: no dysuria, no hematuria, no frequency or urgency, no nephrolithiasis;  MUSCULOSKELETAL: Myalgias, no joint pain, no swelling, no stiffness;  ENDOCRINE: no polyuria, no polydipsia, no cold or heat intolerance;  HEMATOLOGY: no easy bruising or bleeding, no history of clotting disorder;  DERMATOLOGY: no skin rash, no eczema, no pruritus;  PSYCHIATRY: no depression, no anxiety  NEUROLOGY: no syncope, no seizures, no numbness or tingling of hands, no numbness or tingling of feet, no paresis;    Objective   BP (!) 127/96   Pulse (!) 102   Temp 97.2 °F (36.2 °C) (Temporal)   Resp 18   Ht 5' 8\" (1.727 m)   Wt 246 lb (111.6 kg)   SpO2 94%   BMI 37.40 kg/m²     PHYSICAL EXAM:  CONSTITUTIONAL: Alert, appropriate, no acute distress  EYES: Non icteric, EOM intact, pupils equal round   ENT: Mucus membranes moist,external inspection of ears and nose are normal  NECK: Supple, no masses. No palpable thyroid mass  CHEST/LUNGS: CTA bilaterally, normal respiratory effort   CARDIOVASCULAR: RRR, no murmurs. No lower extremity edema  ABDOMEN: soft non-tender, active bowel sounds, no HSM. No palpable masses  EXTREMITIES: warm, full ROM in all 4 extremities, no focal weakness. SKIN: warm, dry with no rashes or lesions  LYMPH: No cervical, clavicular, axillary, or inguinal lymphadenopathy  NEUROLOGIC: follows commands, non focal   PSYCH: mood and affect appropriate.  Alert and oriented to time, place, person        LABORATORY RESULTS REVIEWED/ANALYZED BY ME:  Recent Labs     08/04/22  0008 08/03/22  0835   WBC 10.6 10.8   HGB 15.5 15.8   HCT 45.9 46.8   MCV 93.3 93.6    207       Lab Results   Component Value Date     08/04/2022    K 3.3 (L) 08/04/2022     08/04/2022    CO2 22 08/04/2022    BUN 9 08/04/2022    CREATININE 1.1 08/04/2022    GLUCOSE 113 (H) 08/04/2022    CALCIUM 9.0 08/04/2022    PROT 8.1 08/03/2022    LABALBU 4.0 08/03/2022    BILITOT 0.7 08/03/2022    ALKPHOS 85 08/03/2022    AST 21 08/03/2022    ALT 25 08/03/2022    LABGLOM >60 08/04/2022    GFRAA >59 08/04/2022       Lab Results   Component Value Date    INR 1.14 08/03/2022    PROTIME 14.6 08/03/2022       RADIOLOGY STUDIES REPORT/REVIEWED AND INTERPRETED BY ME:  XR CHEST PORTABLE    Result Date: 8/3/2022  Unremarkable chest Recommendation: Follow up as clinically indicated. Electronically Signed by Lenny Mccoy MD at 03-Aug-2022 10:58:01 AM             CTA PULMONARY W CONTRAST    Result Date: 8/3/2022  1. Extensive bilateral pulmonary emboli, more numerous in the lower lobes. 2. The lungs are grossly clear. Recommendation: Follow up as clinically indicated. All CT scans at this facility utilize dose modulation, iterative reconstruction, and/or weight based dosing when appropriate to reduce radiation dose to as low as reasonably achievable. Amended by Unruly Jewell MD at 03-Aug-2022 12:29:08 PM Electronically Signed by Unruly Jewell MD at 03-Aug-2022 12:23:24 PM              ASSESSMENT:  #Unprovoked DVT/PE  -Risk factors-none, likely unprovoked event. Patient denies any precipitating event. Denies any trauma, prolonged travel, recent immobilization etc.  -Patient will need thrombophilia work-up  -CTA as above, US lower extreme positive DVT  -2D echo 08/03/22:  LVEF 55-60%. Abnormal (paradoxical) motion consistent with right ventricular volume overload and/or elevated RV end-diastolic pressure. Normal diastolic filling pattern for age. Dilated right ventricular size with reduced RV function.  -Troponin normal, proBNP normal  -PESI score @diagnosis          Essentially, findings of extensive pulmonary embolism. 2D echo does show dilated RV with decreased function but with normal troponin and proBNP. Vascular will evaluate need for thrombolysis. If decision is for a direct thrombosis then we will switch him to a low molecular weight heparin. He will be discharged on low molecular weight heparin for 3 weeks with consideration to switch to oral anticoagulant as outpatient.   Further work-up will be performed as outpatient to include:  -Thrombophilia panel  -JAK2, AYSHA and MPL  -[facility] flow cytometry      PLAN:  -Transition to low molecular weight heparin after vascular evaluation  -Vascular consult  -We will likely discharge home on LMWH 1 mg/kg every 12 hours x3 weeks and then switch him to DOACs  Antiphospholipid syndrome work-up here in the hospital  Social work consultation to help with discharge  Arrange outpatient follow-up with Su Boeck in 2 weeks for consideration of switching him to DOACs  Outpatient thrombophilia work-up -Thrombophilia panel  -JAK2, AYSHA and MPL  -[facility] flow cytometry  -No intervention from vascular standpoint.  -Stop IV heparin. Start low molecular weight heparin.     (Please note that portions of this note were completed with a voice recognition program. Efforts were made to edit the dictations but occasionally words are mis-transcribed.)        Herlinda Manzano MD    08/04/22  5:52 AM

## 2022-08-04 NOTE — PROGRESS NOTES
Pre-ambulation pulse ox: 93% on room air  Post-ambulation pulse ox: 91% on room air  After a few minutes at rest: 96% on room air    Patient having shortness of breath, but no signs that he is in distress.

## 2022-08-05 ENCOUNTER — CLINICAL DOCUMENTATION (OUTPATIENT)
Dept: HEMATOLOGY | Age: 42
End: 2022-08-05

## 2022-08-05 VITALS
OXYGEN SATURATION: 95 % | SYSTOLIC BLOOD PRESSURE: 110 MMHG | HEIGHT: 68 IN | BODY MASS INDEX: 37.28 KG/M2 | DIASTOLIC BLOOD PRESSURE: 78 MMHG | RESPIRATION RATE: 16 BRPM | TEMPERATURE: 96.8 F | HEART RATE: 89 BPM | WEIGHT: 246 LBS

## 2022-08-05 LAB
ANION GAP SERPL CALCULATED.3IONS-SCNC: 12 MMOL/L (ref 7–19)
ANTICARDIOLIPIN IGG ANTIBODY: <10 GPL
BASOPHILS ABSOLUTE: 0 K/UL (ref 0–0.2)
BASOPHILS RELATIVE PERCENT: 0.2 % (ref 0–1)
BETA-2 GLYCOPROTEIN 1 IGG ANTIBODY: <10 SGU
BETA-2 GLYCOPROTEIN 1 IGM ANTIBODY: <10 SMU
BUN BLDV-MCNC: 11 MG/DL (ref 6–20)
CALCIUM SERPL-MCNC: 8.8 MG/DL (ref 8.6–10)
CARDIOLIPIN AB IGM: <10 MPL
CHLORIDE BLD-SCNC: 108 MMOL/L (ref 98–111)
CO2: 20 MMOL/L (ref 22–29)
CREAT SERPL-MCNC: 1.2 MG/DL (ref 0.5–1.2)
EOSINOPHILS ABSOLUTE: 0.2 K/UL (ref 0–0.6)
EOSINOPHILS RELATIVE PERCENT: 2 % (ref 0–5)
GFR AFRICAN AMERICAN: >59
GFR NON-AFRICAN AMERICAN: >60
GLUCOSE BLD-MCNC: 103 MG/DL (ref 74–109)
HCT VFR BLD CALC: 48 % (ref 42–52)
HEMOGLOBIN: 16.3 G/DL (ref 14–18)
IMMATURE GRANULOCYTES #: 0 K/UL
LYMPHOCYTES ABSOLUTE: 2.5 K/UL (ref 1.1–4.5)
LYMPHOCYTES RELATIVE PERCENT: 24.8 % (ref 20–40)
MAGNESIUM: 2.2 MG/DL (ref 1.6–2.6)
MCH RBC QN AUTO: 31.6 PG (ref 27–31)
MCHC RBC AUTO-ENTMCNC: 34 G/DL (ref 33–37)
MCV RBC AUTO: 93 FL (ref 80–94)
MONOCYTES ABSOLUTE: 0.9 K/UL (ref 0–0.9)
MONOCYTES RELATIVE PERCENT: 8.9 % (ref 0–10)
NEUTROPHILS ABSOLUTE: 6.3 K/UL (ref 1.5–7.5)
NEUTROPHILS RELATIVE PERCENT: 63.7 % (ref 50–65)
PDW BLD-RTO: 12.9 % (ref 11.5–14.5)
PLATELET # BLD: 223 K/UL (ref 130–400)
PMV BLD AUTO: 9.9 FL (ref 9.4–12.4)
POTASSIUM REFLEX MAGNESIUM: 3.5 MMOL/L (ref 3.5–5)
RBC # BLD: 5.16 M/UL (ref 4.7–6.1)
SODIUM BLD-SCNC: 140 MMOL/L (ref 136–145)
WBC # BLD: 9.9 K/UL (ref 4.8–10.8)

## 2022-08-05 PROCEDURE — 83735 ASSAY OF MAGNESIUM: CPT

## 2022-08-05 PROCEDURE — 36415 COLL VENOUS BLD VENIPUNCTURE: CPT

## 2022-08-05 PROCEDURE — 6370000000 HC RX 637 (ALT 250 FOR IP): Performed by: INTERNAL MEDICINE

## 2022-08-05 PROCEDURE — 85025 COMPLETE CBC W/AUTO DIFF WBC: CPT

## 2022-08-05 PROCEDURE — 2580000003 HC RX 258: Performed by: NURSE PRACTITIONER

## 2022-08-05 PROCEDURE — 80048 BASIC METABOLIC PNL TOTAL CA: CPT

## 2022-08-05 PROCEDURE — 99232 SBSQ HOSP IP/OBS MODERATE 35: CPT | Performed by: INTERNAL MEDICINE

## 2022-08-05 PROCEDURE — 6360000002 HC RX W HCPCS: Performed by: INTERNAL MEDICINE

## 2022-08-05 RX ORDER — ENOXAPARIN SODIUM 150 MG/ML
1 INJECTION SUBCUTANEOUS EVERY 12 HOURS
Qty: 30.66 ML | Refills: 0 | Status: SHIPPED | OUTPATIENT
Start: 2022-08-05 | End: 2022-08-26

## 2022-08-05 RX ADMIN — CETIRIZINE HYDROCHLORIDE 10 MG: 10 TABLET ORAL at 09:48

## 2022-08-05 RX ADMIN — SODIUM CHLORIDE, PRESERVATIVE FREE 10 ML: 5 INJECTION INTRAVENOUS at 09:48

## 2022-08-05 RX ADMIN — ENOXAPARIN SODIUM 105 MG: 150 INJECTION SUBCUTANEOUS at 04:59

## 2022-08-05 NOTE — PROGRESS NOTES
Patient is being discharged today and needs an appoint with Chantelle Boggs in 2 weeks. Hospital follow-up. Please call patient with follow-up appointment.

## 2022-08-05 NOTE — DISCHARGE SUMMARY
Manuel Leach  :  1980  MRN:  [id number]    Admit date:  8/3/2022  Discharge date:  2022    Discharging Physician:  Dr. Malou Peoples Directive: Full Code    Consults: Carol Riddle TO SOCIAL WORK     Primary Care Physician:  No primary care provider on file. Discharge Diagnoses:  Principal Problem:    Pulmonary embolism, bilateral ([address]  Active Problems:    Bilateral pulmonary embolism (HCC)    Acute bilateral deep vein thrombosis (DVT) of popliteal veins (HCC)  Resolved Problems:    * No resolved hospital problems. *      Portions of this note have been copied forward, however, changed to reflect the most current clinical status of this patient. Hospital Course: The patient is a 43 y.o. male with no significant past medical history who presented to [facility] ED complaining of shortness of breath. Patient reported over the past week having increasing shortness of breath. He reported day before admission he went up a flight of stairs and began having significant shortness of breath and chest pain. Patient reported he has had no recent injury or accidents. Patient denied history of blood clot/DVT. Patient denied family history of DVT at admission however after further investigation found his cousin has a clotting disorder. Patient reported approximately 1 week ago having a bad cramp in his left calf which resolved in a few hours. Patient denied any swelling or pain since in the left lower extremity. Patient denied sedentary lifestyle. Patient reported he smoked approximately 1 year total, many years ago. Patient denied IV drug use. Patient denied alcohol use. ED work-up indicated potassium 3.4 and D-dimer greater than 20. CTA chest was obtained and indicated extensive bilateral pulmonary emboli most numerous in the lower lobes.   Venous duplex bilateral lower extremities obtained and indicated thrombus noted on preliminary results. Patient was placed on a heparin drip and vascular surgery was consulted from the ED. Patient admitted to the hospitalist service for bilateral pulmonary embolism. Hematology consulted for recommendation on anticoagulation. Vascular surgery assessed patient does not recommend surgical intervention at this time, recommends out patient follow up in 6 weeks. Hematology recommends patient be discharged home on Lovenox for 3 weeks with outpatient follow-up in 2 weeks to discuss transition to oral anticoagulation. Patient instructed on injections and medication side effects. Patient is now medically stable for discharge home. Significant Diagnostic Studies:   ECHO Complete 2D W Doppler W Color    Result Date: 8/3/2022  Transthoracic Echocardiography Report (TTE)  Demographics   Patient Name  Janet aMy Date of Study          08/03/2022   MRN           [id number]          Gender                 Male   Date of Birth 1980      Room Number            DFC-8103   Age           43 year(s)   Height:       68 inches       Referring Physician    Kishan Patient   Weight:       256.01 pounds   Sonographer            ELLIOTT Tran   BSA:          2.27 m^2        Interpreting Physician Marixa Fraire   BMI:          38.92 kg/m^2  Procedure Type of Study   TTE procedure:ECHO 2D W/DOPPLER/COLOR/CONTRAST. Study Location: Echo Lab Technical Quality: Limited visualization due to body habitus. Patient Status: Inpatient Contrast Medium: Definity. Amount - 3 ml BP: 145/112 mmHg Indications:Pulmonary embolus. Conclusions   Summary  Normal left ventricular size with preserved LV function and an estimated  ejection fraction of approximately 55-60%. Abnormal (paradoxical) motion  consistent with right ventricular volume overload and/or elevated RV  end-diastolic pressure. Normal diastolic filling pattern for age. Dilated right ventricular size with reduced RV function.   No significant valvular stenosis or regurgitation. No evidence of significant pericardial effusion is noted. Aortic root and ascending aorta are within normal limits. The rhythm is sinus. Signature   ----------------------------------------------------------------  Electronically signed by Rico Corcoran(Interpreting physician)  on 08/03/2022 06:52 PM  ----------------------------------------------------------------   Findings   Mitral Valve  Mild bileaflet sclerosis of the mitral valve. No evidence of mitral valve  stenosis or mitral regurgitation. Aortic Valve  Aortic valve appears to be tricuspid. Mild sclerosis of the aortic valve. No significant aortic regurgitation or stenosis is noted. Tricuspid Valve  Tricuspid valve is structurally normal.  No evidence of tricuspid regurgitation. Cannot estimate RVSP. Pulmonic Valve  The pulmonic valve was not well visualized. Left Atrium  Normal size left atrium. Left Ventricle  Normal left ventricular size with preserved LV function and an estimated  ejection fraction of approximately 55-60%. Abnormal (paradoxical) motion consistent with right ventricular volume  overload and/or elevated RV end-diastolic pressure. Mild concentric left ventricular hypertrophy. No evidence of left ventricular mass or thrombus noted. Normal diastolic filling pattern for age. Right Atrium  Normal right atrial dimension with no evidence of thrombus or mass noted. Right Ventricle  Dilated right ventricular size with reduced RV function. Pericardial Effusion  No evidence of significant pericardial effusion is noted. Pleural Effusion  No evidence of pleural effusion. Miscellaneous  Aortic root and ascending aorta are within normal limits. The rhythm is sinus. 2D Measurements and Calculations(cm)   LVIDd: 3.73 cm                      LVIDs: 2.54 cm  IVSd: 1.38 cm  LVPWd: 1.33 cm                      AO Root Dimension: 2.1 cm  Rt. Vent.  Dimension: 4.18 cm        LA Dimension: 3 cm  % Ejection Fraction: 60 %           LA Area: 20.1 cm^2  LA Volume: 52.8 ml                  LV Systolic dimension: 1.37DO  LA Volume Index: 23 ml/m^2          LV PW diastolic: 8.50NH  LV dimension: 3.73 cm               LVOT diameter: 2.1 cm                                      RV Diastolic dimension: 6.14ID  LVEDV: 96.1 ml                      LVEDVI: 42 ml/m^2  LVESV:38.1 ml                       LVESVI: 17 ml/m^2  Ascending Aorta: 3.1 cm  Doppler Measurements and Calculations   AV Peak Velocity:105 cm/s             MV Peak E-Wave: 42.8 cm/s  AV Mean Velocity:75.2 cm/s            MV Peak A-Wave: 60 cm/s  AV Peak Gradient: 4.41 mmHg           MV E/A Ratio: 0.71 %  AV Mean Gradient: 3 mmHg              MV Mean velocity: 47.5cm/s  AV Area (Continuity):3.21 cm^2        MV Peak Gradient: 0.73 mmHg  AV VTI:15 cm/s                        MV Mean gradient: 1 mmHg                                        MV P1/2t: 51 msec                                        MVA by PHT4.31 cm^2   MV E' septal velocity: 6.2cm/s  MV E' lateral velocity:6.2 cm/s      XR CHEST PORTABLE    Result Date: 8/3/2022  NO PRIOR REPORT AVAILABLE Exam:X-RAY OF THE CHEST Clinical data: Shortness of breath. Technique: Single view of the chest. Prior studies: No prior studies submitted. Findings: SinglePA view of the chest was performed. Lovella Shutter is midline. The heart size is within normal limits. No infiltrate, effusion or pneumothorax. The soft tissues and bony structures demonstrate no acute pathology. Unremarkable chest Recommendation: Follow up as clinically indicated.  Electronically Signed by Damari Stiles MD at 03-Aug-2022 10:58:01 AM             VL DUP LOWER EXTREMITY VENOUS BILATERAL    Result Date: 8/3/2022  Vascular Lower Extremities DVT Study Procedure  Demographics   Patient Name     Yusuf Childs Age                   43   Patient Number   [id number]          Gender                Male   Visit Number     [id number]       Interpreting Paula Jean-Baptiste MD                                   Physician   Date of Birth    1980      Referring Physician   Caryl Fernandez   Accession Number [id number]      [address]  Procedure Type of Study:   Veins:Lower Extremities DVT Study, VL LOWER EXTREMITY BILATERAL VENOUS  DUPLEX . Indications for Study:Pulmonary embolism. Impression   There is evidence of subacute deep vein thrombosis in the right lower  extremity involving the posterior tibial veins. There is evidence of subacute deep vein thrombosis in the left lower  extremity involving the popliteal, peroneal, posterior tibial, and soleal  veins. There is no evidence of superficial thrombophlebitis of the bilateral  lower extremities. Signature   ----------------------------------------------------------------  Electronically signed by Paula Jean-Baptiste MD(Interpreting  physician) on 08/03/2022 08:37 PM  ----------------------------------------------------------------  Velocities are measured in cm/s ; Diameters are measured in mm Right Lower Extremities DVT Study Measurements Right 2D Measurements +------------------------------------+----------+---------------+----------+ ! Location                            ! Visualized! Compressibility! Thrombosis! +------------------------------------+----------+---------------+----------+ ! Sapheno Femoral Junction            ! Yes       ! Yes            ! None      ! +------------------------------------+----------+---------------+----------+ ! Common Femoral                      !Yes       ! Yes            ! None      ! +------------------------------------+----------+---------------+----------+ ! Prox Femoral                        !Yes       ! Yes            ! None      ! +------------------------------------+----------+---------------+----------+ ! Mid Femoral                         !Yes       ! Yes            ! None      ! +------------------------------------+----------+---------------+----------+ ! Dist Femoral                        !Partial   !Yes            ! None      ! +------------------------------------+----------+---------------+----------+ ! Deep Femoral                        !Yes       ! Yes            ! None      ! +------------------------------------+----------+---------------+----------+ ! Popliteal                           !Yes       ! Yes            ! None      ! +------------------------------------+----------+---------------+----------+ ! SSV                                 ! Yes       ! Yes            ! None      ! +------------------------------------+----------+---------------+----------+ ! Gastroc                             ! Yes       ! Yes            ! None      ! +------------------------------------+----------+---------------+----------+ ! PTV                                 ! Yes       ! No             !          ! +------------------------------------+----------+---------------+----------+ ! GSV                                 ! Yes       ! Yes            ! None      ! +------------------------------------+----------+---------------+----------+ ! ATV                                 ! Yes       ! Yes            ! None      ! +------------------------------------+----------+---------------+----------+ ! Peroneal                            !Yes       ! Yes            ! None      ! +------------------------------------+----------+---------------+----------+ Left Lower Extremities DVT Study Measurements Left 2D Measurements +------------------------------------+----------+---------------+----------+ ! Location                            ! Visualized! Compressibility! Thrombosis! +------------------------------------+----------+---------------+----------+ ! Sapheno Femoral Junction            ! Yes       ! Yes            ! None      ! +------------------------------------+----------+---------------+----------+ ! Common Femoral !Yes       !Yes            ! None      ! +------------------------------------+----------+---------------+----------+ ! Prox Femoral                        !Yes       ! Yes            ! None      ! +------------------------------------+----------+---------------+----------+ ! Mid Femoral                         !Yes       ! Yes            ! None      ! +------------------------------------+----------+---------------+----------+ ! Dist Femoral                        !Partial   !Yes            ! None      ! +------------------------------------+----------+---------------+----------+ ! Deep Femoral                        !Yes       ! Yes            ! None      ! +------------------------------------+----------+---------------+----------+ ! Popliteal                           !Yes       ! No             !          ! +------------------------------------+----------+---------------+----------+ ! SSV                                 ! Yes       ! Yes            ! None      ! +------------------------------------+----------+---------------+----------+ ! Gastroc                             ! Yes       ! Yes            ! None      ! +------------------------------------+----------+---------------+----------+ ! PTV                                 ! Yes       ! No             !          ! +------------------------------------+----------+---------------+----------+ ! GSV                                 ! Yes       ! Yes            ! None      ! +------------------------------------+----------+---------------+----------+ ! ATV                                 ! Yes       ! Yes            ! None      ! +------------------------------------+----------+---------------+----------+ ! Peroneal                            !Yes       ! No             !          ! +------------------------------------+----------+---------------+----------+ ! Hipolito                              !Yes       ! No             !          ! +------------------------------------+----------+---------------+----------+    CTA PULMONARY W CONTRAST    Result Date: 8/3/2022  NO PRIOR REPORT AVAILABLE <Addendum Signed by Son Owens MD at 03-Aug-2022 12:29:08 PM Findings were communicated to Dr. LEONG [facility] on 08/3/22 at 12:28PM who confirms having received the report. No further action required by the reading radiologist. If the referring clinician has any further questions please call [employer] [phone], option 1. Critical Documentation Completed (CEK) Addendum End> Exam: CTA OF THE CHEST WITH CONTRAST Clinical data: Shortness of breath, tachycardia and left-sided chest pain Rule out PE. Technique: Axial CT angiography images through the lungs with contrast and imaged using soft tissue and lung algorithms. Coronal, sagittal, and 3D volume reconstructions were performed. Reformatted/3D-MPR images were performed. Radiation dose: CTDIvol =49.26 mGy, DLP =1022.75 mGy x cm. Prior studies: Radiograph of the chest done on same day. Findings: Lungs: Minor streaky markings in the posterior lung bases, likely atelectasis or scar. The lungs are otherwise clear with no focal pulmonary consolidation or pleural effusion. No evidence of pneumothorax. Soft Tissues: No mediastinal, axillary or hilar adenopathy. Vascular: Multiple filling defects in the bilateral pulmonary arteries, more extensive to the lower lobes, compatible with extensive pulmonary emboli. bilateral distal and segmental pulmonary arteries. Unremarkable aorta. Grossly unremarkable sized heart. No definite abnormality seen on 3D reformatted images. Bony structures: Mild degenerative disc disease. No acute or destructive abnormality Upper Abdomen: Hypoattenuation throughout the liver, compatible with fatty infiltration. Limited visualization of the solid upper abdominal organs is otherwise grossly unremarkable. 1. Extensive bilateral pulmonary emboli, more numerous in the lower lobes.  2. The lungs are grossly clear. Recommendation: Follow up as clinically indicated. All CT scans at this facility utilize dose modulation, iterative reconstruction, and/or weight based dosing when appropriate to reduce radiation dose to as low as reasonably achievable. Amended by Ike Juarez MD at 03-Aug-2022 12:29:08 PM Electronically Signed by Ike Juarez MD at 03-Aug-2022 12:23:24 PM               Pertinent Labs:   CBC:   Recent Labs     08/03/22  0835 08/04/22  0008 08/05/22  0320   WBC 10.8 10.6 9.9   HGB 15.8 15.5 16.3    216 223     BMP:    Recent Labs     08/03/22  0835 08/04/22  0008 08/05/22  0320    141 140   K 3.4* 3.3* 3.5    106 108   CO2 22 22 20*   BUN 10 9 11   CREATININE 1.1 1.1 1.2   GLUCOSE 113* 113* 103     INR:   Recent Labs     08/03/22 0835   INR 1.14       Physical Exam:  Vital Signs: /78   Pulse 89   Temp 96.8 °F (36 °C)   Resp 16   Ht 5' 8\" (1.727 m)   Wt 246 lb (111.6 kg)   SpO2 95%   BMI 37.40 kg/m²   Physical Exam  Vitals reviewed. Constitutional:       Appearance: He is not ill-appearing. Cardiovascular:      Rate and Rhythm: Normal rate and regular rhythm. Pulses: Normal pulses. Heart sounds: Normal heart sounds. Pulmonary:      Effort: Pulmonary effort is normal.      Breath sounds: Normal breath sounds. Abdominal:      General: Abdomen is flat. Bowel sounds are normal. There is no distension. Palpations: Abdomen is soft. Tenderness: There is no abdominal tenderness. There is no guarding. Musculoskeletal:         General: No tenderness. Normal range of motion. Right lower leg: No edema. Left lower leg: No edema. Skin:     Capillary Refill: Capillary refill takes less than 2 seconds. Neurological:      General: No focal deficit present. Mental Status: He is alert and oriented to person, place, and time.        Discharge Medications:         Medication List        START taking these medications enoxaparin 120 MG/0.8ML injection  Commonly known as: LOVENOX  Inject 0.73 mLs into the skin in the morning and 0.73 mLs in the evening. Do all this for 21 days. CONTINUE taking these medications      Allegra Allergy 180 MG tablet  Generic drug: fexofenadine     MULTIVITAMIN ADULT PO               Where to Get Your Medications        These medications were sent to [facility], [address] [place] [address]  [address], [address]      Phone: [phone]   enoxaparin 120 MG/0.8ML injection         Discharge Instructions: Follow up with PCP in 1 week and Heydi Escobar in 14 days, office has been notified and plans to contact patient with appointment. Take medications as directed. Resume activity as tolerated. Diet: ADULT DIET; Regular     Disposition: Patient is Stableand will be discharged to Home. Time spent on discharge 31 minutes spent in assessing patient, reviewing medications, discussion with nursing, confirming safe discharge plan and preparation of discharge summary.     Signed:  DINA Coles CNP, 8/5/2022 9:40 AM

## 2022-08-05 NOTE — PLAN OF CARE
Problem: Discharge Planning  Goal: Discharge to home or other facility with appropriate resources  8/5/2022 1330 by Darlyn Antnuez RN  Outcome: Completed  8/5/2022 0518 by Evelina Aguiar RN  Outcome: Progressing  Flowsheets  Taken 8/5/2022 0518  Discharge to home or other facility with appropriate resources:   Identify barriers to discharge with patient and caregiver   Arrange for needed discharge resources and transportation as appropriate   Identify discharge learning needs (meds, wound care, etc)  Taken 8/4/2022 2013  Discharge to home or other facility with appropriate resources: Identify barriers to discharge with patient and caregiver     Problem: Pain  Goal: Verbalizes/displays adequate comfort level or baseline comfort level  8/5/2022 1330 by Darlyn Antunez RN  Outcome: Completed  8/5/2022 0518 by Evelina Aguiar RN  Outcome: Progressing  Flowsheets  Taken 8/5/2022 0518  Verbalizes/displays adequate comfort level or baseline comfort level:   Encourage patient to monitor pain and request assistance   Assess pain using appropriate pain scale  Taken 8/4/2022 2355  Verbalizes/displays adequate comfort level or baseline comfort level:   Assess pain using appropriate pain scale   Encourage patient to monitor pain and request assistance  Note: Pt denied pain throughout shift     Problem: Safety - Adult  Goal: Free from fall injury  8/5/2022 1330 by Darlyn Antunez RN  Outcome: Completed  8/5/2022 0518 by Evelina Aguiar RN  Outcome: Progressing  Flowsheets (Taken 8/5/2022 0518)  Free From Fall Injury: Instruct family/caregiver on patient safety  Note: Free from falls this shift     Problem: ABCDS Injury Assessment  Goal: Absence of physical injury  8/5/2022 1330 by Darlyn Antunez RN  Outcome: Completed  8/5/2022 0518 by Evelina Aguiar RN  Flowsheets (Taken 8/5/2022 0518)  Absence of Physical Injury: Implement safety measures based on patient assessment  Note: Safe from physical injury this shift

## 2022-08-05 NOTE — PROGRESS NOTES
CLINICAL PHARMACY NOTE: MEDS TO BEDS    Total # of Prescriptions Filled: 1   The following medications were delivered to the patient:  Enoxaparin Sodium 120 mg/0.8 ml    Additional Documentation:   Gave Rx to patient. Patient paid copay with credit card. Gave instruction sheet to Polina.

## 2022-08-05 NOTE — PROGRESS NOTES
MEDICAL ONCOLOGY PROGRESS NOTE    Pt Name: Armen Blas  MRN: [id number]  YOB: 1980  Date of evaluation: 8/5/2022      Subjective-no vascular intervention. Feels well. No new complaints. No chest pain. HISTORY OF PRESENT ILLNESS:    Diagnosis  Unprovoked DVT/PE    Treatment summary  IV heparin  8/4/2022-transition to LMWH 1 mg/kg every 12 hours if no vascular intervention  Anticipation of switching to oral anticoagulant in 3 weeks    Hematology history  Armen Blas was first seen by me on 8/3/2022. The patient is a 43years old male who presented ER department with complaints of acute onset chest discomfort short of breath with exertion. Short of breath started 3 days ago's. Patient denies any prior history of DVT or PE. No family history. Patient recalled that he had leg cramping about a week ago. 8/3/2022-chest x-ray was unremarkable  8/3/2022-CTA showed extensive bilateral pulm emboli, more numerous in the lower lobes. The lungs are grossly clear. 8/3/2020-US lower extremity bilaterally showed positive DVT in Lt Pop V, B/L PTV, Lt Stuart and Lt Soleal veins. No evidence of SVT or reflux noted at this time. 8/3/2022-2D echo showed preserved EF with RV dysfunction  8/4/2022-he was evaluated by vascular surgery, Dr. Jaun Valente. No vascular intervention recommended. Plan to follow-up in clinic in 6 weeks with repeat ultrasound. Past Medical History:    Past Medical History:   Diagnosis Date    Allergic rhinitis     Dislocation of right shoulder joint 2000    Laceration of left elbow 2012       Past Surgical History:    Dislocation right shoulder, 2000  Laceration left elbow, 2012    Social History:    Smoking status: Quit 15 years ago.   ETOH status: No  Resides: [place], [place]    Family History:   Family History   Problem Relation Age of Onset    Cancer Mother 64        Bone Cancer    Diabetes Father     Diabetes Brother        Current Hospital Medications:    Current Facility-Administered Medications   Medication Dose Route Frequency Provider Last Rate Last Admin    naproxen (NAPROSYN) tablet 500 mg  500 mg Oral Daily Vilma Truong MD   500 mg at 08/04/22 1122    Calamine 8-8 % lotion   Topical PRN Vilma Truong MD        potassium chloride (KLOR-CON M) extended release tablet 40 mEq  40 mEq Oral PRN Delta Albe, APRN - CNP   40 mEq at 08/04/22 0932    Or    potassium bicarb-citric acid (EFFER-K) effervescent tablet 40 mEq  40 mEq Oral PRN Delta Albe, APRN - CNP        Or    potassium chloride 10 mEq/100 mL IVPB (Peripheral Line)  10 mEq IntraVENous PRN Delta Albe, APRN - CNP        cetirizine (ZYRTEC) tablet 10 mg  10 mg Oral Daily Birtton Duran MD   10 mg at 08/04/22 1122    enoxaparin (LOVENOX) injection 105 mg  1 mg/kg SubCUTAneous Q12H Perla Guardado MD   105 mg at 08/05/22 0459    sodium chloride flush 0.9 % injection 5-40 mL  5-40 mL IntraVENous 2 times per day Delta Albe, APRN - CNP   10 mL at 08/04/22 2012    sodium chloride flush 0.9 % injection 5-40 mL  5-40 mL IntraVENous PRN DINA Mullen - CNP        0.9 % sodium chloride infusion   IntraVENous PRN Delta Albe, APRN - CNP        ondansetron (ZOFRAN-ODT) disintegrating tablet 4 mg  4 mg Oral Q8H PRN Delta Albe, APRN - CNP        Or    ondansetron (ZOFRAN) injection 4 mg  4 mg IntraVENous Q6H PRN Delta Albe, APRN - CNP        polyethylene glycol (GLYCOLAX) packet 17 g  17 g Oral Daily PRN Delta Albe, APRN - CNP        acetaminophen (TYLENOL) tablet 650 mg  650 mg Oral Q6H PRN Delta Albe, APRN - CNP        Or    acetaminophen (TYLENOL) suppository 650 mg  650 mg Rectal Q6H PRN Delta Albe, APRN - CNP           Allergies:    Allergies   Allergen Reactions    Morphine Anaphylaxis         Subjective   REVIEW OF SYSTEMS:   CONSTITUTIONAL: no fever, no night sweats, fatigue;  HEENT: no blurring of vision, no double vision, no hearing difficulty, no tinnitus, no ulceration, no epistaxis;  LUNGS: no cough, no hemoptysis, no wheeze,  shortness of breath;  CARDIOVASCULAR: no palpitation,  chest pain,  shortness of breath;  GI: no abdominal pain, no nausea, no vomiting, no diarrhea, no constipation;  SAIMA: no dysuria, no hematuria, no frequency or urgency, no nephrolithiasis;  MUSCULOSKELETAL: Myalgias, no joint pain, no swelling, no stiffness;  ENDOCRINE: no polyuria, no polydipsia, no cold or heat intolerance;  HEMATOLOGY: no easy bruising or bleeding, no history of clotting disorder;  DERMATOLOGY: no skin rash, no eczema, no pruritus;  PSYCHIATRY: no depression, no anxiety  NEUROLOGY: no syncope, no seizures, no numbness or tingling of hands, no numbness or tingling of feet, no paresis;    Objective   /87   Pulse 88   Temp 97.3 °F (36.3 °C) (Temporal)   Resp 18   Ht 5' 8\" (1.727 m)   Wt 246 lb (111.6 kg)   SpO2 94%   BMI 37.40 kg/m²     PHYSICAL EXAM:  CONSTITUTIONAL: Alert, appropriate, no acute distress  EYES: Non icteric, EOM intact, pupils equal round   ENT: Mucus membranes moist,external inspection of ears and nose are normal  NECK: Supple, no masses. No palpable thyroid mass  CHEST/LUNGS: CTA bilaterally, normal respiratory effort   CARDIOVASCULAR: RRR, no murmurs. No lower extremity edema  ABDOMEN: soft non-tender, active bowel sounds, no HSM. No palpable masses  EXTREMITIES: warm, full ROM in all 4 extremities, no focal weakness. SKIN: warm, dry with no rashes or lesions  LYMPH: No cervical, clavicular, axillary, or inguinal lymphadenopathy  NEUROLOGIC: follows commands, non focal   PSYCH: mood and affect appropriate.  Alert and oriented to time, place, person        LABORATORY RESULTS REVIEWED/ANALYZED BY ME:  Recent Labs     08/05/22  0320 08/04/22  0008 08/03/22  0835   WBC 9.9 10.6 10.8   HGB 16.3 15.5 15.8   HCT 48.0 45.9 46.8   MCV 93.0 93.3 93.6    216 207       Lab Results   Component Value Date     08/05/2022    K but with normal troponin and proBNP. Vascular will evaluate need for thrombolysis. If decision is for a direct thrombosis then we will switch him to a low molecular weight heparin. He will be discharged on low molecular weight heparin for 3 weeks with consideration to switch to oral anticoagulant as outpatient. Further work-up will be performed as outpatient to include:  -Thrombophilia panel  -JAK2, AYSHA and MPL  -PNH flow cytometry  -Follow-up results antiphospholipid syndrome    Disposition-okay to discharge from my standpoint. We will arrange follow-up in clinic [facility]. The office will call the patient. Follow-up will be in 2 weeks. PLAN:  -Discharged home on low molecular weight heparin 1 mg/kg every 12 hours x3 weeks. Arrange outpatient follow-up with [facility] in 2 weeks for consideration of switching him to DOACs  Outpatient thrombophilia work-up -Thrombophilia panel  -JAK2, AYSHA and MPL  -PNH flow cytometry  -No intervention from vascular standpoint.  -Stop IV heparin.   Start low molecular weight heparin.  -Follow-up results antiphospholipid syndrome.  -Follow-up with [facility] on 8/19/2022 at 11:45 AM.    (Please note that portions of this note were completed with a voice recognition program. Efforts were made to edit the dictations but occasionally words are mis-transcribed.)        Martín Maguire MD    08/05/22  5:21 AM

## 2022-08-05 NOTE — CARE COORDINATION
Spoke with pt re: cost of Lovenox. Price is $229.29. pt stated he was able to cover that. Also gave pt 2 Eliquis discount cards to have once he gets finished with the lovenox, if the MD opts to use that. Nurse notifed of above.    Electronically signed by Hanna Tiwari RN on 8/5/2022 at 12:53 PM

## 2022-08-08 ENCOUNTER — CARE COORDINATION (OUTPATIENT)
Dept: CASE MANAGEMENT | Age: 42
End: 2022-08-08

## 2022-08-08 DIAGNOSIS — I26.99 PULMONARY EMBOLISM, BILATERAL (HCC): Primary | ICD-10-CM

## 2022-08-08 NOTE — CARE COORDINATION
[facility] Transitions Initial Follow Up Call    Call within 2 business days of discharge: Yes    Patient: Melyssa Medina Patient : 1980   MRN: [id number]  Reason for Admission: Bilateral P.E. Discharge Date: 22 RARS: Readmission Risk Score: 5      Last Discharge [address] [address]       Date Complaint Diagnosis Description Type Department Provider    8/3/22 Shortness of Breath Bilateral pulmonary embolism [facility]) ED to Hosp-Admission (Discharged) (ADMITTED) L MED SURG Ashley Ramirez MD; Sharee Rodriguez ... Spoke with: [address]: [address]    Non-face-to-face services provided:  Reviewed encounter information for continuity of care prior to follow up Care Transitions Coordination phone call - chart notes, consults, progress notes, test results, med list, appointments, AVS, other information. Care Transitions 24 Hour Call    Schedule Follow Up Appointment with PCP: Completed  Do you have a copy of your discharge instructions?: Yes  Do you have all of your prescriptions and are they filled?: Yes  Have you been contacted by a [address]?: No  Have you scheduled your follow up appointment?: Yes  How are you going to get to your appointment?: Car - drive self  Do you feel like you have everything you need to keep you well at home?: Yes  Care Transitions Interventions       Placed a call to the number listed for patient for an initial follow up call for Care Transitions Coordination following discharge from the hospital.  Introduced myself and explained the purpose of my call as well as verifying name, date of birth of patient. Spoke with patient regarding hospitalization, discharge and status thus far. He reported that he feels much better than he has felt in a long time. He denied any abnormal signs or symptoms. He said that he is not having any shortness of breath, cough, congestion, etc.  He said he has been doing a lot of walking.   He said he has not had any issues with the Lovenox injections really except one time. He did say that he messed up one syringe and had to waste it. He has not had any issues since. He is not having any side effects that he is aware of at this time. He has all of his meds and is taking them as ordered. He is not on Warfarin. He is aware of his hospital follow up appointment with Dr. Beni Dias. He is aware of the need to call and see about getting in to see vascular and hematology. He is eating well, drinking well, tolerating activity well. He is sleeping well. He is not aware of any other issues. To call prn any needs. CTN to follow up as indicated. Transitions of Care Initial Call    Was this an external facility discharge? No   Challenges to be reviewed by the provider   Additional needs identified to be addressed with provider: No  none       Method of communication with provider : none    Advance Care Planning:   Does patient have an Advance Directive: none on file, education provided. Advance Care Planning   Healthcare Decision Maker:  Patient does not have a current health care decision maker listed and is not able to name one at this time. Care Transition Nurse contacted the patient by telephone to perform post hospital discharge assessment. Verified name and  with patient as identifiers. Provided introduction to self, and explanation of the CTN role. CTN reviewed discharge instructions, medical action plan and red flags with patient who verbalized understanding. Patient given an opportunity to ask questions and does not have any further questions or concerns at this time. Were discharge instructions available to patient? Yes. Reviewed appropriate site of care based on symptoms and resources available to patient including: PCP  Specialist  Urgent care clinics  [address]   When to call [address] The patient agrees to contact the PCP office for questions related to their healthcare. Medication reconciliation was performed with patient, who verbalizes understanding of administration of home medications. CTN provided contact information. Plan for follow-up call in 5-7 days based on severity of symptoms and risk factors.   Plan for next call: Plan for next call - assess changes since last call, assess overall status, pain, appetite/nutritional status, sleep patterns, abnormal signs and symptoms, availability and effectiveness of medications, activity level and tolerance, findings from follow up appointments, medication/treatment changes, any additional teaching needs, ie education regarding self care mgmt in the home - disease process mgmt, symptom mgmt, diet/hydration, seeking medical attention, who/when to call prn any needs, etc.    Follow Up  Future Appointments   Date Time Provider Dakotah Milton   8/11/2022 12:00 PM MD ANNIE Hartmann [place]   [facility]2022 [facility]   8/19/2022 12:00 PM SCHEDULE, L MED ONC MA L MED ONC Pallavi Chand RN

## 2022-08-08 NOTE — CARE COORDINATION
[facility] Transitions Initial Follow Up Call    Call within 2 business days of discharge: Yes    Patient: Chavo Locke Patient : 1980   MRN: [id number]  Reason for Admission: Bilateral P.E. Discharge Date: 22 RARS: Readmission Risk Score: 5      Last Discharge [facility]       Date Complaint Diagnosis Description Type Department Provider    8/3/22 Shortness of Breath Bilateral pulmonary embolism [facility]) ED to Hosp-Admission (Discharged) (ADMITTED) L MED SURG Sylvia Blandon MD; Iqra Martinez ... Spoke with: N/A    Facility: [address] [place] [address]    Non-face-to-face services provided:  Reviewed encounter information for continuity of care prior to follow up Care Transitions Coordination phone call - chart notes, consults, progress notes, test results, med list, appointments, AVS, other information. Care Transitions 24 Hour Call    Care Transitions Interventions       Attempted to make contact with patient/caregiver for an initial Care Transitions Coordination follow up call post discharge from the hospital without success. Unable to leave a message regarding intent of call and call back information. Call was forwarded to an unidentifiable voice messaging system/mobile voice mail. CTN will follow up again at a later time. Any previously scheduled hospital follow up appointments as noted.     Follow Up  Future Appointments   Date Time Provider Dakotah Milton   2022 12:00 PM MD ANNIE Fuentes [place]   2022 11:45 AM Anabel Leung 88, APRN N [facility]   2022 12:00 PM SCHEDULE, L MED ONC MA L MED ONC Pallavi Ulrich RN

## 2022-08-11 ENCOUNTER — OFFICE VISIT (OUTPATIENT)
Dept: INTERNAL MEDICINE | Age: 42
End: 2022-08-11
Payer: COMMERCIAL

## 2022-08-11 VITALS
DIASTOLIC BLOOD PRESSURE: 78 MMHG | HEART RATE: 74 BPM | SYSTOLIC BLOOD PRESSURE: 128 MMHG | OXYGEN SATURATION: 96 % | BODY MASS INDEX: 37.44 KG/M2 | TEMPERATURE: 98.6 F | WEIGHT: 246.25 LBS

## 2022-08-11 DIAGNOSIS — I26.99 BILATERAL PULMONARY EMBOLISM (HCC): ICD-10-CM

## 2022-08-11 DIAGNOSIS — I82.433 ACUTE BILATERAL DEEP VEIN THROMBOSIS (DVT) OF POPLITEAL VEINS (HCC): Primary | ICD-10-CM

## 2022-08-11 LAB
DRVVT CONFIRMATION TEST: ABNORMAL RATIO
DRVVT SCREEN: 35 SEC (ref 33–44)
DRVVT,DIL: ABNORMAL SEC (ref 33–44)
HEXAGONAL PHOSPHOLIPID NEUTRALIZAT TEST: ABNORMAL
LUPUS ANTICOAG INTERP: ABNORMAL
PLT NEUTA: ABNORMAL
PT D: 17.2 SEC (ref 12–15.5)
PTT D: 106 SEC (ref 32–48)
PTT-D CORR REFLEX: ABNORMAL SEC (ref 32–48)
PTT-HEPARIN NEUTRALIZED: 47 SEC (ref 32–48)
REPTILASE TIME: 18.2 SEC
THROMBIN TIME: 127.1 SEC (ref 14.7–19.5)

## 2022-08-11 PROCEDURE — 99204 OFFICE O/P NEW MOD 45 MIN: CPT | Performed by: INTERNAL MEDICINE

## 2022-08-11 SDOH — ECONOMIC STABILITY: FOOD INSECURITY: WITHIN THE PAST 12 MONTHS, THE FOOD YOU BOUGHT JUST DIDN'T LAST AND YOU DIDN'T HAVE MONEY TO GET MORE.: NEVER TRUE

## 2022-08-11 SDOH — ECONOMIC STABILITY: FOOD INSECURITY: WITHIN THE PAST 12 MONTHS, YOU WORRIED THAT YOUR FOOD WOULD RUN OUT BEFORE YOU GOT MONEY TO BUY MORE.: NEVER TRUE

## 2022-08-11 ASSESSMENT — ENCOUNTER SYMPTOMS
TROUBLE SWALLOWING: 0
ABDOMINAL DISTENTION: 0
WHEEZING: 0
BLOOD IN STOOL: 0
NAUSEA: 0
SINUS PRESSURE: 0
EYE DISCHARGE: 0
VOMITING: 0
ABDOMINAL PAIN: 0
SORE THROAT: 0
BACK PAIN: 0
SHORTNESS OF BREATH: 0
EYE ITCHING: 0

## 2022-08-11 ASSESSMENT — PATIENT HEALTH QUESTIONNAIRE - PHQ9
1. LITTLE INTEREST OR PLEASURE IN DOING THINGS: 0
SUM OF ALL RESPONSES TO PHQ QUESTIONS 1-9: 0
SUM OF ALL RESPONSES TO PHQ QUESTIONS 1-9: 0
SUM OF ALL RESPONSES TO PHQ9 QUESTIONS 1 & 2: 0
2. FEELING DOWN, DEPRESSED OR HOPELESS: 0
SUM OF ALL RESPONSES TO PHQ QUESTIONS 1-9: 0
SUM OF ALL RESPONSES TO PHQ QUESTIONS 1-9: 0

## 2022-08-11 ASSESSMENT — SOCIAL DETERMINANTS OF HEALTH (SDOH): HOW HARD IS IT FOR YOU TO PAY FOR THE VERY BASICS LIKE FOOD, HOUSING, MEDICAL CARE, AND HEATING?: NOT HARD AT ALL

## 2022-08-11 NOTE — PROGRESS NOTES
[address] [facility]  [address] [address] [place] [address]  Dept: [phone]  Dept Fax: [phone]: [phone]      Visit Date: 8/11/2022    Ben Caldera a 43 y.o. male who presents today for:  Chief Complaint   Patient presents with    Follow-up         HPI:     There is a new patient to establish. He was recently in the hospital with lower extremity DVT and bilateral pulmonary emboli. Past Medical History:   Diagnosis Date    Allergic rhinitis     Dislocation of right shoulder joint 2000    Laceration of left elbow 2012      No past surgical history on file. Family History   Problem Relation Age of Onset    Cancer Mother 64        Bone Cancer    Diabetes Father     Diabetes Brother        Social History     Tobacco Use    Smoking status: Former     Packs/day: 0.25     Types: Cigarettes     Start date: 01/2006     Quit date: 12/2006     Years since quitting: 15.7    Smokeless tobacco: Never   Substance Use Topics    Alcohol use: Not Currently      Current Outpatient Medications   Medication Sig Dispense Refill    enoxaparin (LOVENOX) 120 MG/0.8ML injection Inject 0.73 mLs into the skin in the morning and 0.73 mLs in the evening. Do all this for 21 days. 30.66 mL 0    fexofenadine (ALLEGRA) 180 MG tablet Take 180 mg by mouth in the morning and 180 mg before bedtime. Multiple Vitamin (MULTIVITAMIN ADULT PO) Take by mouth       No current facility-administered medications for this visit. Allergies   Allergen Reactions    Morphine Anaphylaxis         Subjective:     Review of Systems   Constitutional:  Negative for activity change, appetite change, fatigue and fever. HENT:  Negative for congestion, hearing loss, sinus pressure, sore throat and trouble swallowing. Eyes:  Negative for discharge and itching. Respiratory:  Negative for shortness of breath and wheezing.     Cardiovascular:  Negative for chest pain, palpitations and leg swelling. Gastrointestinal:  Negative for abdominal distention, abdominal pain, blood in stool, nausea and vomiting. Endocrine: Negative for cold intolerance, heat intolerance and polydipsia. Genitourinary:  Negative for flank pain, frequency, hematuria and urgency. Musculoskeletal:  Negative for arthralgias, back pain and joint swelling. Skin:  Negative for rash and wound. Allergic/Immunologic: Negative for environmental allergies and food allergies. Neurological:  Negative for dizziness, tremors, syncope, weakness, numbness and headaches. Hematological:  Negative for adenopathy. Psychiatric/Behavioral:  Negative for agitation and hallucinations. The patient is not nervous/anxious. Objective:      /78   Pulse 74   Temp 98.6 °F (37 °C)   Wt 246 lb 4 oz (111.7 kg)   SpO2 96%   BMI 37.44 kg/m²    Physical Exam  Constitutional:       General: He is not in acute distress. Appearance: He is well-developed. He is not diaphoretic. HENT:      Head: Normocephalic and atraumatic. Right Ear: External ear normal.      Left Ear: External ear normal.      Nose: Nose normal.      Mouth/Throat:      Pharynx: No oropharyngeal exudate. Eyes:      General: No scleral icterus. Right eye: No discharge. Left eye: No discharge. Conjunctiva/sclera: Conjunctivae normal.      Pupils: Pupils are equal, round, and reactive to light. Neck:      Thyroid: No thyromegaly. Vascular: No JVD. Trachea: No tracheal deviation. Cardiovascular:      Rate and Rhythm: Normal rate and regular rhythm. Heart sounds: Normal heart sounds. No murmur heard. No friction rub. No gallop. Pulmonary:      Effort: Pulmonary effort is normal. No respiratory distress. Breath sounds: Normal breath sounds. No wheezing or rales. Abdominal:      General: Bowel sounds are normal. There is no distension. Palpations: Abdomen is soft. There is no mass. Tenderness:  There is no abdominal tenderness. There is no guarding or rebound. Musculoskeletal:         General: No tenderness or deformity. Normal range of motion. Cervical back: Normal range of motion and neck supple. Lymphadenopathy:      Cervical: No cervical adenopathy. Skin:     General: Skin is warm and dry. Coloration: Skin is not pale. Findings: No erythema or rash. Neurological:      Mental Status: He is alert and oriented to person, place, and time. Cranial Nerves: No cranial nerve deficit. Motor: No abnormal muscle tone. Coordination: Coordination normal.      Deep Tendon Reflexes: Reflexes are normal and symmetric. Reflexes normal.   Psychiatric:         Behavior: Behavior normal.         Thought Content: Thought content normal.         Judgment: Judgment normal.        Assessment:      Diagnosis Orders   1. Acute bilateral deep vein thrombosis (DVT) of popliteal veins (HCC)        2. Bilateral pulmonary embolism (HCC)                 Plan:     DVT of both lower extremities with bilateral pulmonary emboli approximately 1 week ago. He presented to the hospital short of breath and had a cramp in his legs about a week prior was found to have multiple pulmonary emboli on his exam and was admitted to the hospital for anticoagulation and hematology consultation. He is now getting Lovenox at home 1220 mg twice daily and he has an appointment a week from tomorrow with hematology to go over the results of his coagulopathy studies that were sent off. He denies any bloody bowel movements or any problems with any blood dyscrasias secondary to being on the blood thinner. He is feeling much better. We will see him back at his scheduled appointment. I told him that the results of the hematology work-up will determine whether or not he stays on blood thinner for now on RA he will be on it for a limited amount of time. We will see him back in 3 months.     Return in about 3 months (around 11/11/2022). Patient given educational materials- see patient instructions. Discussed use, benefit, and side effects of prescribedmedications. All patient questions answered. Pt voiced understanding. Reviewedhealth maintenance. Instructed to continue current medications, diet and exercise. Patient agreed with treatment plan. **This report has been created usingvoice recognition software. It may contain minor errors which are inherent in voicerecognition technology. **    Electronically signed by Mian Cohen MD on 8/11/2022 at 12:11 PM

## 2022-08-16 ENCOUNTER — CARE COORDINATION (OUTPATIENT)
Dept: CASE MANAGEMENT | Age: 42
End: 2022-08-16

## 2022-08-16 NOTE — CARE COORDINATION
[facility] 45 Transitions Follow Up Call    2022    Patient: Selvin Contreras  Patient : 1980   MRN: [id number]  Reason for Admission: bilateral PE  Discharge Date: 22 RARS: Readmission Risk Score: 5         Spoke with: NA    Attempted to reach patient via phone for transition call. VM left stating purpose of call along with my contact information requesting a return call. Peter Abdullahi  [facility]  Care Transitions  [phone]    Care Transitions Subsequent and Final Call    Subsequent and Final Calls  Care Transitions Interventions  Other Interventions:              Follow Up  Future Appointments   Date Time Provider Dakotah Milton   2022 11:45 AM [facility]   2022 12:00 PM SCHEDULE, L MED ONC MA L MED ONC Pallavi BOYER   2022  9:30 AM MD ANNIE Valle [facility]       Peter Abdullahi LPN

## 2022-08-18 DIAGNOSIS — I82.433 ACUTE BILATERAL DEEP VEIN THROMBOSIS (DVT) OF POPLITEAL VEINS (HCC): ICD-10-CM

## 2022-08-18 DIAGNOSIS — I26.99 PULMONARY EMBOLISM, BILATERAL (HCC): Primary | ICD-10-CM

## 2022-08-18 NOTE — PROGRESS NOTES
Progress Note      Pt Name: Yue Bardales  YOB: 1980  MRN: [id number]    Date of evaluation: 8/19/2022  History Obtained From:  Patient, EMR    Portions of this note have been copied forward, however, changed to reflect the most current clinical status of this patient. Chief Complaint   Patient presents with    Follow-up     Hospital D/C       HISTORY OF PRESENT ILLNESS:     Diagnosis  Provoked DVT/PE, 8/2022 (prolonged travel)     Treatment summary  IV heparin  8/4/2022-transition to LMWH 1 mg/kg every 12 hours if no vascular intervention  Anticipation of switching to      Hematology history  Yue Bardales was first seen by Dr. Nesha Butler on 8/3/2022, as an inpatient at [facility]. The patient presented ER department with complaints of acute onset chest discomfort and short of breath with exertion. Short of breath started 3 days ago. Patient denies any prior history of DVT or PE. Family history of blood clots in his uncle who also suffered from lung cancer. Patient recalled that he had leg cramping about a week ago. He also reports long periods of travel with his work. He had benign 18-hour flight to [facility] about 1 month prior to presentation even more recently, he had a nonstop 5-1/2 - 6 hour trip to [place], [place]. He is a former smoker, having quit 15 years ago. He denies testosterone use. 8/3/2022-chest x-ray was unremarkable  8/3/2022-CTA showed extensive bilateral pulm emboli, more numerous in the lower lobes. The lungs are grossly clear. 8/3/2020-US lower extremity bilaterally showed positive DVT in Lt Pop V, B/L PTV, Lt Stuart and Lt Soleal veins. No evidence of SVT or reflux noted at this time. 8/3/2022-2D echo showed preserved EF with RV dysfunction  8/4/2022-he was evaluated by vascular surgery, Dr. Martin Rosario. No vascular intervention recommended.     8/19/2022-evaluated in clinic-continue Lovenox for 1 additional week (patient wants to complete) followed by Eliquis 10 mg po BID x7 days and 5 mg po BID thereafter      Labs 8/4/2022:  Cardiolipin antibodies: IgG: <10, IgM: <10  Beta-2 Glycoprotein: IgG: <10, IgM: <10  Lupus Anticoagulant: not detected    Yahaira Schwartz is seen in hematology clinic 8/19/2022 to complete thrombophilia evaluation and discuss changing anticoagulation from Lovenox to DOAC. Chest pain and shortness of breath are resolved he denies lower extremity swelling or claudication. He reports compliance with Lovenox and denies bleeding. Past Medical History:   Diagnosis Date    Acute pulmonary embolism without acute cor pulmonale (HCC) 08/03/2022    Allergic rhinitis     Dislocation of right shoulder joint 2000    DVT of lower extremity, bilateral ([address] 08/03/2022    Laceration of left elbow 2012     No past surgical history on file. Current Outpatient Medications   Medication Sig Dispense Refill    [START ON 8/26/2022] apixaban (ELIQUIS) 5 MG TABS tablet Take 2 tablets by mouth 2 times daily for 7 days 28 tablet 0    [START ON 9/9/2022] apixaban (ELIQUIS) 5 MG TABS tablet Take 1 tablet by mouth 2 times daily 60 tablet 3    enoxaparin (LOVENOX) 120 MG/0.8ML injection Inject 0.73 mLs into the skin in the morning and 0.73 mLs in the evening. Do all this for 21 days. 30.66 mL 0    fexofenadine (ALLEGRA) 180 MG tablet Take 180 mg by mouth in the morning and 180 mg before bedtime. Multiple Vitamin (MULTIVITAMIN ADULT PO) Take by mouth       No current facility-administered medications for this visit.       Allergies   Allergen Reactions    Morphine Anaphylaxis     Social History     Tobacco Use    Smoking status: Former     Packs/day: 0.25     Types: Cigarettes     Start date: 01/2006     Quit date: 12/2006     Years since quitting: 15.7    Smokeless tobacco: Never   Vaping Use    Vaping Use: Never used   Substance Use Topics    Alcohol use: Not Currently    Drug use: Never     Family History   Problem Relation Age of Onset    Cancer Mother 64        Bone Cancer    Diabetes Father     Diabetes Brother        Review of Systems   Constitutional:  Negative for fatigue and fever. HENT:  Negative for dental problem, hearing loss, mouth sores, nosebleeds, sore throat and trouble swallowing. Eyes:  Negative for discharge and itching. Respiratory:  Negative for cough, shortness of breath and wheezing. Cardiovascular:  Negative for chest pain, palpitations and leg swelling. Gastrointestinal:  Negative for abdominal pain, constipation, diarrhea, nausea and vomiting. Endocrine: Negative for cold intolerance and heat intolerance. Genitourinary:  Negative for dysuria, frequency, hematuria and urgency. Musculoskeletal:  Negative for arthralgias, joint swelling and myalgias. Skin:  Negative for pallor and rash. Allergic/Immunologic: Negative for environmental allergies and immunocompromised state. Neurological:  Negative for seizures, syncope and numbness. Hematological:  Negative for adenopathy. Does not bruise/bleed easily. Psychiatric/Behavioral:  Negative for agitation, behavioral problems and confusion. The patient is not nervous/anxious. Physical Exam  Vitals reviewed. Constitutional:       General: He is not in acute distress. Appearance: He is well-developed. He is not toxic-appearing or diaphoretic. Comments: Wearing a facial mask. HENT:      Head: Normocephalic and atraumatic. Right Ear: External ear normal.      Left Ear: External ear normal.      Nose: Nose normal.      Mouth/Throat:      Mouth: Mucous membranes are moist.   Eyes:      General: No scleral icterus. Right eye: No discharge. Left eye: No discharge. Conjunctiva/sclera: Conjunctivae normal.   Neck:      Trachea: No tracheal deviation. Cardiovascular:      Rate and Rhythm: Normal rate and regular rhythm. Pulmonary:      Effort: Pulmonary effort is normal. No respiratory distress. Breath sounds: Normal breath sounds. No wheezing or rales.    Chest:   Breasts:     Right: No supraclavicular adenopathy. Left: No supraclavicular adenopathy. Abdominal:      General: Bowel sounds are normal. There is no distension. Palpations: Abdomen is soft. Tenderness: There is no abdominal tenderness. There is no guarding. Genitourinary:     Comments: Exam deferred  Musculoskeletal:         General: No tenderness or deformity. Cervical back: Neck supple. No muscular tenderness. Comments: Normal ROM all four extremities   Lymphadenopathy:      Cervical:      Right cervical: No superficial or deep cervical adenopathy. Left cervical: No superficial or deep cervical adenopathy. Upper Body:      Right upper body: No supraclavicular adenopathy. Left upper body: No supraclavicular adenopathy. Comments:      Skin:     General: Skin is warm and dry. Findings: No rash. Neurological:      Mental Status: He is alert and oriented to person, place, and time. Comments: follows commands, non-focal   Psychiatric:         Behavior: Behavior normal. Behavior is cooperative. Thought Content: Thought content normal.         Judgment: Judgment normal.      Comments: Alert and oriented to person, place and time. Vitals:    08/19/22 1157   BP: (!) 140/72   Pulse: 75   SpO2: 99%   Weight: 248 lb (112.5 kg)   Height: 5' 8\" (1.727 m)      Wt Readings from Last 3 Encounters:   08/19/22 248 lb (112.5 kg)   08/11/22 246 lb 4 oz (111.7 kg)   08/03/22 246 lb (111.6 kg)         Result Date: 8/3/2022  Unremarkable chest Recommendation: Follow up as clinically indicated. Electronically Signed by Yesica Nolasco MD at 03-Aug-2022 10:58:01 AM             CTA PULMONARY W CONTRAST    Result Date: 8/3/2022  1. Extensive bilateral pulmonary emboli, more numerous in the lower lobes. 2. The lungs are grossly clear. Recommendation: Follow up as clinically indicated.  All CT scans at this facility utilize dose modulation, iterative reconstruction, and/or weight based dosing when appropriate to reduce radiation dose to as low as reasonably achievable. Amended by Adrian Rizo MD at 03-Aug-2022 12:29:08 PM Electronically Signed by Adrian Rizo MD at 03-Aug-2022 12:23:24 PM                CBC:   Lab Results   Component Value Date    WBC 8.30 08/19/2022    HGB 15.4 08/19/2022    HCT 47.5 08/19/2022    MCV 97.7 (H) 08/19/2022     08/19/2022     #Provoked DVT/PE  -CTA as above, US lower extreme positive DVT  -2D echo 08/03/22:  LVEF 55-60%. Abnormal (paradoxical) motion consistent with right ventricular volume overload and/or elevated RV end-diastolic pressure. Normal diastolic filling pattern for age. Dilated right ventricular size with reduced RV function.  -Troponin normal, proBNP normal  -PESI score @diagnosis            Essentially, findings of extensive pulmonary embolism. 2D echo does show dilated RV with decreased function but with normal troponin and proBNP. No intervention from vascular standpoint. Labs 8/4/2022:  Cardiolipin antibodies: IgG: <10, IgM: <10  Beta-2 Glycoprotein: IgG: <10, IgM: <10  Lupus Anticoagulant: not detected    No evidence of APS  Patient will complete Lovenox Rx (has 1 week remaining), then begin Eliquis 10 mg po BID x7 days, followed by 5 mg po BID thereafter with strict compliance. (Creatinine 1.2, GFR >60 on 8/5/2022)  R/B/E discussed. Handout provided. Avoid NSAID's. Encourage frequent ambulation with prolonged travel  Recommend routine, age-appropriate tumor screenings  Repeat BLE venous doppler in 1 month    Completion of prothrombotic evaluation requested  -Thrombophilia panel  -JAK2, AYSHA and MPL  -PNH flow cytometry    BMI 37.0-37.9  Body mass index is 37.71 kg/m². Federal guidelines recommend that people under the age of 72 should have a BMI of 18.5-25 and people age 72 and older should have a BMI of 23-30.   If yours is outside the range, we recommend you utilize a diet and exercise program to get yours into the range. We also recommend you speak with your primary care doctor should any specific advice be needed regarding special diets or programs which would be appropriate for your circumstances. Care plan discussed with patient    Orders Placed This Encounter   Procedures    VL EXTREMITY VENOUS BILATERAL    JAK2 V617F Mutation Analysis, Qual rflx CALR/Exon 12-15/MPL    Factor 5 Leiden    Prothrombin Gene Mutation    Antithrombin III    Protein C Functional    Protein S Antigen, Free    Miscellaneous Sendout     Return in about 5 weeks (around 9/23/2022) for follow up with DINA Devries. I have seen, examined and reviewed this patient medication list, appropriate labs and imaging studies. I reviewed relevant medical records and others physicians notes. I discussed the plan of care with the patient. I answered all questions to the patients satisfaction. I have also reviewed the chief complaint (CC) and part of the history (History of Present Illness (HPI), Past Family Social History [facility]), or Review of Systems (ROS) and made changes when appropriated. Notes and labs from recent hospitalization reviewed. Dictated utilizing Dragon transcription software.         DINA Jones  1:25 PM  8/19/2022

## 2022-08-19 ENCOUNTER — OFFICE VISIT (OUTPATIENT)
Dept: HEMATOLOGY | Age: 42
End: 2022-08-19
Payer: COMMERCIAL

## 2022-08-19 ENCOUNTER — HOSPITAL ENCOUNTER (OUTPATIENT)
Dept: INFUSION THERAPY | Age: 42
Discharge: HOME | End: 2022-08-19
Payer: COMMERCIAL

## 2022-08-19 VITALS
HEIGHT: 68 IN | WEIGHT: 248 LBS | SYSTOLIC BLOOD PRESSURE: 140 MMHG | BODY MASS INDEX: 37.59 KG/M2 | DIASTOLIC BLOOD PRESSURE: 72 MMHG | OXYGEN SATURATION: 99 % | HEART RATE: 75 BPM

## 2022-08-19 DIAGNOSIS — I26.99 PULMONARY EMBOLISM, BILATERAL (HCC): ICD-10-CM

## 2022-08-19 DIAGNOSIS — I82.433 ACUTE BILATERAL DEEP VEIN THROMBOSIS (DVT) OF POPLITEAL VEINS (HCC): ICD-10-CM

## 2022-08-19 DIAGNOSIS — I26.99 PULMONARY EMBOLISM, BILATERAL (HCC): Primary | ICD-10-CM

## 2022-08-19 DIAGNOSIS — Z79.01 ANTICOAGULATED: ICD-10-CM

## 2022-08-19 DIAGNOSIS — Z71.89 CARE PLAN DISCUSSED WITH PATIENT: ICD-10-CM

## 2022-08-19 LAB
BASOPHILS ABSOLUTE: 0.03 K/UL (ref 0.01–0.08)
BASOPHILS RELATIVE PERCENT: 0.4 % (ref 0.1–1.2)
EOSINOPHILS ABSOLUTE: 0.11 K/UL (ref 0.04–0.54)
EOSINOPHILS RELATIVE PERCENT: 1.3 % (ref 0.7–7)
HCT VFR BLD CALC: 47.5 % (ref 40.1–51)
HEMOGLOBIN: 15.4 G/DL (ref 13.7–17.5)
LYMPHOCYTES ABSOLUTE: 1.9 K/UL (ref 1.18–3.74)
LYMPHOCYTES RELATIVE PERCENT: 22.9 % (ref 19.3–53.1)
MCH RBC QN AUTO: 31.7 PG (ref 25.7–32.2)
MCHC RBC AUTO-ENTMCNC: 32.4 G/DL (ref 32.3–36.5)
MCV RBC AUTO: 97.7 FL (ref 79–92.2)
MONOCYTES ABSOLUTE: 0.7 K/UL (ref 0.24–0.82)
MONOCYTES RELATIVE PERCENT: 8.4 % (ref 4.7–12.5)
NEUTROPHILS ABSOLUTE: 5.55 K/UL (ref 1.56–6.13)
NEUTROPHILS RELATIVE PERCENT: 66.9 % (ref 34–71.1)
PDW BLD-RTO: 13 % (ref 11.6–14.4)
PLATELET # BLD: 234 K/UL (ref 163–337)
PMV BLD AUTO: 11 FL (ref 7.4–10.4)
RBC # BLD: 4.86 M/UL (ref 4.63–6.08)
WBC # BLD: 8.3 K/UL (ref 4.23–9.07)

## 2022-08-19 PROCEDURE — 99212 OFFICE O/P EST SF 10 MIN: CPT

## 2022-08-19 PROCEDURE — 99214 OFFICE O/P EST MOD 30 MIN: CPT | Performed by: NURSE PRACTITIONER

## 2022-08-19 PROCEDURE — 85025 COMPLETE CBC W/AUTO DIFF WBC: CPT

## 2022-08-19 ASSESSMENT — ENCOUNTER SYMPTOMS
TROUBLE SWALLOWING: 0
NAUSEA: 0
WHEEZING: 0
CONSTIPATION: 0
SHORTNESS OF BREATH: 0
DIARRHEA: 0
EYE ITCHING: 0
ABDOMINAL PAIN: 0
VOMITING: 0
EYE DISCHARGE: 0
SORE THROAT: 0
COUGH: 0

## 2022-08-23 ENCOUNTER — CARE COORDINATION (OUTPATIENT)
Dept: CASE MANAGEMENT | Age: 42
End: 2022-08-23

## 2022-08-23 LAB
ANTITHROMBIN ACTIVITY: 99 % (ref 76–128)
ANTITHROMBIN ANTIGEN: 82 % (ref 82–136)
PROTEIN C FUNCTIONAL: 118 % (ref 83–168)
PROTEIN S, FUNCTIONAL: 100 % (ref 66–143)

## 2022-08-23 NOTE — CARE COORDINATION
[facility] Transitions Follow Up Call    2022    Patient: Thang Nuñez  Patient : 1980   MRN: [id number]  Reason for Admission:   Discharge Date: 22 RARS: Readmission Risk Score: 5         Spoke with: N/A    Care Transitions Subsequent and Final Call    Subsequent and Final Calls  Care Transitions Interventions  Other Interventions: Follow Up : Attempted to make contact with William Colin for a f/u call without success. Unable to leave a message regarding intent of call and call back information. Will discharge from CTN services at this time due to inability to make contact.       Future Appointments   Date Time Provider Dakotah Milton   2022  1:00 PM [facility] VASCULAR ROOM [facility]   2022 10:45 AM [facility]   2022  9:30 AM Alberto De Dios MD [facility]       Merline Cavalier, RN

## 2022-08-24 LAB
FACTOR V LEIDEN: NEGATIVE
SPECIMEN: NORMAL

## 2022-08-26 LAB
PROTHROMBIN G20210A MUTATION: NEGATIVE
PT PCR SPECIMEN: NORMAL

## 2022-09-22 RX ORDER — APIXABAN 5 MG/1
TABLET, FILM COATED ORAL
Qty: 28 TABLET | Refills: 0 | OUTPATIENT
Start: 2022-09-22

## 2022-09-26 ENCOUNTER — HOSPITAL ENCOUNTER (OUTPATIENT)
Dept: NON INVASIVE DIAGNOSTICS | Age: 42
Discharge: HOME | End: 2022-09-26
Payer: COMMERCIAL

## 2022-09-26 DIAGNOSIS — I82.433 ACUTE BILATERAL DEEP VEIN THROMBOSIS (DVT) OF POPLITEAL VEINS (HCC): ICD-10-CM

## 2022-09-26 DIAGNOSIS — I26.99 PULMONARY EMBOLISM, BILATERAL (HCC): ICD-10-CM

## 2022-09-26 PROCEDURE — 93970 EXTREMITY STUDY: CPT

## 2022-09-29 NOTE — PROGRESS NOTES
Progress Note      Pt Name: Brien Reis  YOB: 1980  MRN: [id number]    Date of evaluation: 9/30/2022  History Obtained From:  Patient, EMR    Portions of this note have been copied forward, however, changed to reflect the most current clinical status of this patient. Chief Complaint   Patient presents with    Follow-up     Pulmonary embolism, bilateral ([address]     HISTORY OF PRESENT ILLNESS/INTERVAL HISTORY:  Brien Reis is a 70-year-old gentleman returning to the clinic to discuss prothrombotic evaluation regarding a new diagnosis of PE/DVT, likely provoked due to prolonged travel. He also completed venous Doppler, here to discuss results. He is currently treated with Eliquis 5 mg po BID. he reports compliance, denying missed doses. No bleeding issues. Lia Daley further denies chest pain, heart palpitations, shortness of breath, lower extremity pain. Diagnosis  Provoked DVT/PE, 8/2022 (prolonged travel)     Treatment summary  IV heparin  8/4/2022-transition to LMWH 1 mg/kg every 12 hours if no vascular intervention  8/26/2022 changed to Eliquis 10 mg po BID x7 days followed by 5 mg po BID thereafter; anticipate 6 months of anticoagulation     Hematology history  Brien Reis was first seen by Dr. Isabella Chand on 8/3/2022, as an inpatient at [address] [place] [address]. The patient presented ER department with complaints of acute onset chest discomfort and short of breath with exertion. Short of breath started 3 days prior to presentation. Patient denies any prior history of DVT or PE. Family history of blood clots in his uncle who also suffered from lung cancer. Patient recalled that he had leg cramping about a week prior to arrival.  He also reports long periods of travel with his work. He had been on an 18-hour flight to [facility] about 1 month prior to presentation even more recently, he had a nonstop 5-1/2 - 6 hour trip to [place], [place]. He is a former smoker, having quit 15 years ago.   He denies testosterone use.    8/3/2022-chest x-ray was unremarkable  8/3/2022-CTA showed extensive bilateral pulm emboli, more numerous in the lower lobes. The lungs are grossly clear. 8/3/2020-US lower extremity bilaterally showed positive DVT in Lt Pop V, B/L PTV, Lt Stuart and Lt Soleal veins. No evidence of SVT or reflux noted at this time. 8/3/2022-2D echo showed preserved EF with RV dysfunction  8/4/2022-he was evaluated by vascular surgery, Dr. Tawny Silver. No vascular intervention recommended. 8/19/2022-evaluated in clinic-continue Lovenox for 1 additional week (patient wants to complete) followed by Eliquis 10 mg po BID x7 days and 5 mg po BID thereafter      Labs 8/4/2022:  Cardiolipin antibodies: IgG: <10, IgM: <10  Beta-2 Glycoprotein: IgG: <10, IgM: <10  Lupus Anticoagulant: not detected    Natasha Gonzales was seen in hematology clinic 8/19/2022. Thrombophilia evaluation requested and Eliquis prescribed (patient will complete 1 remaining week of Lovenox that he has on hand, then changed to Eliquis). Chest pain and shortness of breath are resolved he denies lower extremity swelling or claudication. He reports compliance with Lovenox and denies bleeding. Labs 8/19/2022:   Factor 5 Leiden: negative   Antithrombin panel: Activity: 99  Protein C: 118   Functional Protein S: 100  Prothrombin Mutation: negative  FLOW for PNH: negative  JAK2 w/reflexes: requested, not completed    Venous Doppler 9/26/22 at [facility]: There is evidence of subacute to more chronic appearing partially recanalized deep vein thrombosis in the left lower extremityinvolving the popliteal vein. There is no evidence of deep venous thrombosis (DVT) in the right lower extremities. There is no evidence of superficial thrombophlebitis of the bilateral lower extremities. Natasha Gonzales was seen in follow-up 9/30/2022. He is tolerating Eliquis without difficulty. No bleeding issues. Recommend a minimum of 6 months anticoagulation.     JAK2 with reflexes and Free protein S requested. Past Medical History:   Diagnosis Date    Acute pulmonary embolism without acute cor pulmonale (HCC) 08/03/2022    Allergic rhinitis     Dislocation of right shoulder joint 2000    DVT of lower extremity, bilateral ([address] 08/03/2022    Laceration of left elbow 2012     No past surgical history on file. Current Outpatient Medications   Medication Sig Dispense Refill    fexofenadine (ALLEGRA) 180 MG tablet Take 180 mg by mouth in the morning and 180 mg before bedtime. Multiple Vitamin (MULTIVITAMIN ADULT PO) Take by mouth      apixaban (ELIQUIS) 5 MG TABS tablet Take 1 tablet by mouth 2 times daily 60 tablet 5    apixaban (ELIQUIS) 5 MG TABS tablet Take 2 tablets by mouth 2 times daily for 7 days 28 tablet 0     No current facility-administered medications for this visit. Allergies   Allergen Reactions    Morphine Anaphylaxis     Social History     Tobacco Use    Smoking status: Former     Packs/day: 0.25     Types: Cigarettes     Start date: 01/2006     Quit date: 12/2006     Years since quitting: 15.8    Smokeless tobacco: Never   Vaping Use    Vaping Use: Never used   Substance Use Topics    Alcohol use: Not Currently    Drug use: Never     Family History   Problem Relation Age of Onset    Cancer Mother 64        Bone Cancer    Diabetes Father     Diabetes Brother        Review of Systems   Constitutional:  Negative for fatigue and fever. HENT:  Negative for dental problem, hearing loss, mouth sores, nosebleeds, sore throat and trouble swallowing. Eyes:  Negative for discharge and itching. Respiratory:  Negative for cough, shortness of breath and wheezing. Cardiovascular:  Negative for chest pain, palpitations and leg swelling. Gastrointestinal:  Negative for abdominal pain, constipation, diarrhea, nausea and vomiting. Endocrine: Negative for cold intolerance and heat intolerance. Genitourinary:  Negative for dysuria, frequency, hematuria and urgency.    Musculoskeletal: Negative for arthralgias, joint swelling and myalgias. Skin:  Negative for pallor and rash. Allergic/Immunologic: Negative for environmental allergies and immunocompromised state. Neurological:  Negative for seizures, syncope and numbness. Hematological:  Negative for adenopathy. Does not bruise/bleed easily. Psychiatric/Behavioral:  Negative for agitation, behavioral problems and confusion. The patient is not nervous/anxious. Physical Exam  Vitals reviewed. Constitutional:       General: He is not in acute distress. Appearance: He is well-developed. He is not toxic-appearing or diaphoretic. Comments: Wearing a facial mask. HENT:      Head: Normocephalic and atraumatic. Right Ear: External ear normal.      Left Ear: External ear normal.      Nose: Nose normal.      Mouth/Throat:      Mouth: Mucous membranes are moist.   Eyes:      General: No scleral icterus. Right eye: No discharge. Left eye: No discharge. Conjunctiva/sclera: Conjunctivae normal.   Neck:      Trachea: No tracheal deviation. Cardiovascular:      Rate and Rhythm: Normal rate and regular rhythm. Pulmonary:      Effort: Pulmonary effort is normal. No respiratory distress. Breath sounds: Normal breath sounds. No wheezing or rales. Abdominal:      General: Bowel sounds are normal. There is no distension. Palpations: Abdomen is soft. Tenderness: There is no abdominal tenderness. There is no guarding. Genitourinary:     Comments: Exam deferred  Musculoskeletal:         General: No tenderness or deformity. Cervical back: Neck supple. No muscular tenderness. Comments: Normal ROM all four extremities   Lymphadenopathy:      Cervical:      Right cervical: No superficial or deep cervical adenopathy. Left cervical: No superficial or deep cervical adenopathy. Upper Body:      Right upper body: No supraclavicular adenopathy.       Left upper body: No supraclavicular adenopathy. Comments:      Skin:     General: Skin is warm and dry. Findings: No rash. Neurological:      Mental Status: He is alert and oriented to person, place, and time. Comments: follows commands, non-focal   Psychiatric:         Behavior: Behavior normal. Behavior is cooperative. Thought Content: Thought content normal.         Judgment: Judgment normal.      Comments: Alert and oriented to person, place and time. Vitals:    09/30/22 1056   BP: (!) 140/96   Pulse: 87   SpO2: 95%   Weight: 255 lb (115.7 kg)   Height: 5' 8\" (1.727 m)      Wt Readings from Last 3 Encounters:   09/30/22 255 lb (115.7 kg)   08/19/22 248 lb (112.5 kg)   08/11/22 246 lb 4 oz (111.7 kg)         Result Date: 8/3/2022  Unremarkable chest Recommendation: Follow up as clinically indicated. Electronically Signed by Sneha Zuluaga MD at 03-Aug-2022 10:58:01 AM             CTA PULMONARY W CONTRAST    Result Date: 8/3/2022  1. Extensive bilateral pulmonary emboli, more numerous in the lower lobes. 2. The lungs are grossly clear. Recommendation: Follow up as clinically indicated. All CT scans at this facility utilize dose modulation, iterative reconstruction, and/or weight based dosing when appropriate to reduce radiation dose to as low as reasonably achievable. Amended by Juan Thomason MD at 03-Aug-2022 12:29:08 PM Electronically Signed by Juan Thomason MD at 03-Aug-2022 12:23:24 PM                  CBC:   Lab Results   Component Value Date    WBC 9.50 (H) 09/30/2022    HGB 16.0 09/30/2022    HCT 47.7 09/30/2022    MCV 95.8 (H) 09/30/2022     09/30/2022      Assessment    #Provoked DVT/PE 8/2022  -CTA as noted in the hematology history above, US lower extremity positive DVT  -2D echo 08/03/22:  LVEF 55-60%. Abnormal (paradoxical) motion consistent with right ventricular volume overload and/or elevated RV end-diastolic pressure. Normal diastolic filling pattern for age.   Dilated right ventricular size with reduced RV function.  -Troponin normal, proBNP normal  -PESI score @diagnosis            Essentially, findings of extensive pulmonary embolism. 2D echo does show dilated RV with decreased function but with normal troponin and proBNP. No intervention from vascular standpoint. Labs 8/4/2022:  Cardiolipin antibodies: IgG: <10, IgM: <10  Beta-2 Glycoprotein: IgG: <10, IgM: <10  Lupus Anticoagulant: not detected    No evidence of APS  Patient initiated Eliquis 10 mg po BID 8/26/2022, followed by 5 mg po BID thereafter. He reports compliance and denies missed doses. No bleeding issues. No further chest pain. No lower extremity swelling or claudication. Labs 8/19/2022:   Factor 5 Leiden: negative   Antithrombin panel: Activity: 99  Protein C: 118   Functional Protein S: 100  Prothrombin Mutation: negative  FLOW for PNH: negative  JAK2 requested: Not completed    repeat venous Doppler 9/26/22 at [facility]: There is evidence of subacute to more chronic appearing partially recanalized deep vein thrombosis in the left lower extremityinvolving the popliteal vein. There is no evidence of deep venous thrombosis (DVT) in the right lower extremities. There is no evidence of superficial thrombophlebitis of the bilateral lower extremities. Recommend continue Eliquis for a minimum of 6 months for provoked PE/DVT as discussed with Dr. Tierney Parisi. JAK2 with reflexes and free protein S requested today    BMI 37.0-37.9  Body mass index is 38.77 kg/m². Federal guidelines recommend that people under the age of 72 should have a BMI of 18.5-25 and people age 72 and older should have a BMI of 23-30. If yours is outside the range, we recommend you utilize a diet and exercise program to get yours into the range. We also recommend you speak with your primary care doctor should any specific advice be needed regarding special diets or programs which would be appropriate for your circumstances.     Elevated blood pressure reading in clinic without diagnosis of hypertension  Asymptomatic  Encouraged patient to monitor and report persistent elevations to PCP    Care plan discussed with patient    Orders Placed This Encounter   Procedures    JAK2 V617F Mutation Analysis, Qual rflx CALR/Exon 12-15/MPL    Protein S Antigen, Free       Return in about 4 months (around 2/6/2023) for follow up with DINA Walter. I have seen, examined and reviewed this patient medication list, appropriate labs and imaging studies. I reviewed relevant medical records and others physicians notes. I discussed the plan of care with the patient. I answered all questions to the patients satisfaction. I have also reviewed the chief complaint (CC) and part of the history (History of Present Illness (HPI), Past Family Social History [facility]), or Review of Systems (ROS) and made changes when appropriated. Dictated utilizing Dragon transcription software.         DINA Paez  9:39 AM  10/2/2022

## 2022-09-30 ENCOUNTER — OFFICE VISIT (OUTPATIENT)
Dept: HEMATOLOGY | Age: 42
End: 2022-09-30
Payer: COMMERCIAL

## 2022-09-30 ENCOUNTER — HOSPITAL ENCOUNTER (OUTPATIENT)
Dept: INFUSION THERAPY | Age: 42
Discharge: HOME | End: 2022-09-30
Payer: COMMERCIAL

## 2022-09-30 VITALS
WEIGHT: 255 LBS | SYSTOLIC BLOOD PRESSURE: 140 MMHG | BODY MASS INDEX: 38.65 KG/M2 | DIASTOLIC BLOOD PRESSURE: 96 MMHG | HEIGHT: 68 IN | HEART RATE: 87 BPM | OXYGEN SATURATION: 95 %

## 2022-09-30 DIAGNOSIS — I82.433 ACUTE BILATERAL DEEP VEIN THROMBOSIS (DVT) OF POPLITEAL VEINS (HCC): ICD-10-CM

## 2022-09-30 DIAGNOSIS — I26.99 PULMONARY EMBOLISM, BILATERAL (HCC): Primary | ICD-10-CM

## 2022-09-30 DIAGNOSIS — Z79.01 ANTICOAGULATED: ICD-10-CM

## 2022-09-30 DIAGNOSIS — Z71.89 CARE PLAN DISCUSSED WITH PATIENT: ICD-10-CM

## 2022-09-30 DIAGNOSIS — I26.99 PULMONARY EMBOLISM, BILATERAL (HCC): ICD-10-CM

## 2022-09-30 DIAGNOSIS — R03.0 ELEVATED BLOOD PRESSURE READING IN OFFICE WITHOUT DIAGNOSIS OF HYPERTENSION: ICD-10-CM

## 2022-09-30 LAB
BASOPHILS ABSOLUTE: 0.03 K/UL (ref 0.01–0.08)
BASOPHILS RELATIVE PERCENT: 0.3 % (ref 0.1–1.2)
EOSINOPHILS ABSOLUTE: 0.33 K/UL (ref 0.04–0.54)
EOSINOPHILS RELATIVE PERCENT: 3.5 % (ref 0.7–7)
HCT VFR BLD CALC: 47.7 % (ref 40.1–51)
HEMOGLOBIN: 16 G/DL (ref 13.7–17.5)
LYMPHOCYTES ABSOLUTE: 2.11 K/UL (ref 1.18–3.74)
LYMPHOCYTES RELATIVE PERCENT: 22.2 % (ref 19.3–53.1)
MCH RBC QN AUTO: 32.1 PG (ref 25.7–32.2)
MCHC RBC AUTO-ENTMCNC: 33.5 G/DL (ref 32.3–36.5)
MCV RBC AUTO: 95.8 FL (ref 79–92.2)
MONOCYTES ABSOLUTE: 0.52 K/UL (ref 0.24–0.82)
MONOCYTES RELATIVE PERCENT: 5.5 % (ref 4.7–12.5)
NEUTROPHILS ABSOLUTE: 6.5 K/UL (ref 1.56–6.13)
NEUTROPHILS RELATIVE PERCENT: 68.4 % (ref 34–71.1)
PDW BLD-RTO: 13.5 % (ref 11.6–14.4)
PLATELET # BLD: 185 K/UL (ref 163–337)
PMV BLD AUTO: 10.5 FL (ref 7.4–10.4)
RBC # BLD: 4.98 M/UL (ref 4.63–6.08)
WBC # BLD: 9.5 K/UL (ref 4.23–9.07)

## 2022-09-30 PROCEDURE — 36415 COLL VENOUS BLD VENIPUNCTURE: CPT

## 2022-09-30 PROCEDURE — 85025 COMPLETE CBC W/AUTO DIFF WBC: CPT

## 2022-09-30 PROCEDURE — 99214 OFFICE O/P EST MOD 30 MIN: CPT | Performed by: NURSE PRACTITIONER

## 2022-09-30 PROCEDURE — 99212 OFFICE O/P EST SF 10 MIN: CPT

## 2022-10-02 ASSESSMENT — ENCOUNTER SYMPTOMS
WHEEZING: 0
VOMITING: 0
NAUSEA: 0
DIARRHEA: 0
SORE THROAT: 0
COUGH: 0
EYE ITCHING: 0
TROUBLE SWALLOWING: 0
SHORTNESS OF BREATH: 0
CONSTIPATION: 0
ABDOMINAL PAIN: 0
EYE DISCHARGE: 0

## 2022-10-07 LAB — PROTEIN S ANTIGEN, FREE: 117 % (ref 74–147)

## 2022-10-10 LAB
BACKGROUND, 480093: NORMAL
BACKGROUND, 489163: NORMAL
CALR MUTATION DETECTION RESULT, 489451: NORMAL
DIRECTOR REVIEW: NORMAL
EXTRACTION: NORMAL
JAK2 EXONS 12-15 MUT DET PCR: NORMAL
JAK2 V617F MUTATION DETECTION 489201: NORMAL
Lab: NORMAL
METHOD: NORMAL
MPL MUTATION ANALYSIS RESULT: NORMAL
REFERENCES: NORMAL
REFERENCES: NORMAL
REFLEX: NORMAL

## 2022-11-10 DIAGNOSIS — I82.433 ACUTE BILATERAL DEEP VEIN THROMBOSIS (DVT) OF POPLITEAL VEINS (HCC): ICD-10-CM

## 2022-11-10 DIAGNOSIS — I26.99 PULMONARY EMBOLISM, BILATERAL (HCC): ICD-10-CM

## 2022-11-10 NOTE — TELEPHONE ENCOUNTER
Dedra Abebe called requesting a refill of the below medication which has been pended for you:     Requested Prescriptions     Pending Prescriptions Disp Refills    apixaban (ELIQUIS) 5 MG TABS tablet 60 tablet 5     Sig: Take 1 tablet by mouth 2 times daily       Last Appointment Date: 8/11/2022  Next Appointment Date: 11/11/2022    Allergies   Allergen Reactions    Morphine Anaphylaxis

## 2022-11-11 ENCOUNTER — TELEPHONE (OUTPATIENT)
Dept: INFUSION THERAPY | Age: 42
End: 2022-11-11

## 2022-11-11 ENCOUNTER — OFFICE VISIT (OUTPATIENT)
Dept: INTERNAL MEDICINE | Age: 42
End: 2022-11-11
Payer: COMMERCIAL

## 2022-11-11 VITALS
WEIGHT: 258 LBS | HEART RATE: 75 BPM | HEIGHT: 68 IN | DIASTOLIC BLOOD PRESSURE: 80 MMHG | BODY MASS INDEX: 39.1 KG/M2 | SYSTOLIC BLOOD PRESSURE: 130 MMHG | OXYGEN SATURATION: 98 %

## 2022-11-11 DIAGNOSIS — I26.99 PULMONARY EMBOLISM, BILATERAL (HCC): ICD-10-CM

## 2022-11-11 DIAGNOSIS — I82.433 ACUTE BILATERAL DEEP VEIN THROMBOSIS (DVT) OF POPLITEAL VEINS (HCC): ICD-10-CM

## 2022-11-11 DIAGNOSIS — I82.433 ACUTE BILATERAL DEEP VEIN THROMBOSIS (DVT) OF POPLITEAL VEINS (HCC): Primary | ICD-10-CM

## 2022-11-11 PROCEDURE — 99214 OFFICE O/P EST MOD 30 MIN: CPT | Performed by: INTERNAL MEDICINE

## 2022-11-11 ASSESSMENT — ENCOUNTER SYMPTOMS
SINUS PRESSURE: 0
ABDOMINAL DISTENTION: 0
EYE DISCHARGE: 0
VOMITING: 0
SORE THROAT: 0
SHORTNESS OF BREATH: 0
BLOOD IN STOOL: 0
NAUSEA: 0
WHEEZING: 0
ABDOMINAL PAIN: 0
EYE ITCHING: 0
BACK PAIN: 0
TROUBLE SWALLOWING: 0

## 2022-11-11 NOTE — TELEPHONE ENCOUNTER
Requested Prescriptions     Pending Prescriptions Disp Refills    apixaban (ELIQUIS) 5 MG TABS tablet 28 tablet 0     Sig: Take 2 tablets by mouth 2 times daily for 7 days

## 2022-11-11 NOTE — PROGRESS NOTES
[address] [facility]  [address] [address] [address] [place] [address]  Dept: [phone]  Dept Fax: [phone]: [phone]      Visit Date: 11/11/2022    Ilda Mancera a 43 y.o. male who presents today for:  Chief Complaint   Patient presents with    Follow-up     DVT         HPI:     For follow-up 3-month visit on his DVT and bilateral pulmonary emboli. His coagulopathy studies came back okay. Hematology office is got him on blood thinner now and they told him that he will need to stay on until February. Past Medical History:   Diagnosis Date    Acute pulmonary embolism without acute cor pulmonale (HCC) 08/03/2022    Allergic rhinitis     Dislocation of right shoulder joint 2000    DVT of lower extremity, bilateral ([address] 08/03/2022    Laceration of left elbow 2012      No past surgical history on file. Family History   Problem Relation Age of Onset    Cancer Mother 64        Bone Cancer    Diabetes Father     Diabetes Brother        Social History     Tobacco Use    Smoking status: Former     Packs/day: 0.25     Types: Cigarettes     Start date: 01/2006     Quit date: 12/2006     Years since quitting: 15.9    Smokeless tobacco: Never   Substance Use Topics    Alcohol use: Not Currently      Current Outpatient Medications   Medication Sig Dispense Refill    apixaban (ELIQUIS) 5 MG TABS tablet Take 1 tablet by mouth 2 times daily 60 tablet 5    fexofenadine (ALLEGRA) 180 MG tablet Take 180 mg by mouth in the morning and 180 mg before bedtime. Multiple Vitamin (MULTIVITAMIN ADULT PO) Take by mouth      apixaban (ELIQUIS) 5 MG TABS tablet Take 2 tablets by mouth 2 times daily for 7 days 28 tablet 0     No current facility-administered medications for this visit. Allergies   Allergen Reactions    Morphine Anaphylaxis         Subjective:     Review of Systems   Constitutional:  Negative for activity change, appetite change, fatigue and fever.    HENT: Negative for congestion, hearing loss, sinus pressure, sore throat and trouble swallowing. Eyes:  Negative for discharge and itching. Respiratory:  Negative for shortness of breath and wheezing. Cardiovascular:  Negative for chest pain, palpitations and leg swelling. Gastrointestinal:  Negative for abdominal distention, abdominal pain, blood in stool, nausea and vomiting. Endocrine: Negative for cold intolerance, heat intolerance and polydipsia. Genitourinary:  Negative for flank pain, frequency, hematuria and urgency. Musculoskeletal:  Negative for arthralgias, back pain and joint swelling. Skin:  Negative for rash and wound. Allergic/Immunologic: Negative for environmental allergies and food allergies. Neurological:  Negative for dizziness, tremors, syncope, weakness, numbness and headaches. Hematological:  Negative for adenopathy. Psychiatric/Behavioral:  Negative for agitation and hallucinations. The patient is not nervous/anxious. Objective:      /80   Pulse 75   Ht 5' 8\" (1.727 m)   Wt 258 lb (117 kg)   SpO2 98%   BMI 39.23 kg/m²    Physical Exam  Constitutional:       General: He is not in acute distress. Appearance: He is well-developed. He is not diaphoretic. HENT:      Head: Normocephalic and atraumatic. Right Ear: External ear normal.      Left Ear: External ear normal.      Nose: Nose normal.      Mouth/Throat:      Pharynx: No oropharyngeal exudate. Eyes:      General: No scleral icterus. Right eye: No discharge. Left eye: No discharge. Conjunctiva/sclera: Conjunctivae normal.      Pupils: Pupils are equal, round, and reactive to light. Neck:      Thyroid: No thyromegaly. Vascular: No JVD. Trachea: No tracheal deviation. Cardiovascular:      Rate and Rhythm: Normal rate and regular rhythm. Heart sounds: Normal heart sounds. No murmur heard. No friction rub. No gallop.    Pulmonary:      Effort: Pulmonary effort is normal. No respiratory distress. Breath sounds: Normal breath sounds. No wheezing or rales. Abdominal:      General: Bowel sounds are normal. There is no distension. Palpations: Abdomen is soft. There is no mass. Tenderness: There is no abdominal tenderness. There is no guarding or rebound. Musculoskeletal:         General: No tenderness or deformity. Normal range of motion. Cervical back: Normal range of motion and neck supple. Lymphadenopathy:      Cervical: No cervical adenopathy. Skin:     General: Skin is warm and dry. Coloration: Skin is not pale. Findings: No erythema or rash. Neurological:      Mental Status: He is alert and oriented to person, place, and time. Cranial Nerves: No cranial nerve deficit. Motor: No abnormal muscle tone. Coordination: Coordination normal.      Deep Tendon Reflexes: Reflexes are normal and symmetric. Reflexes normal.   Psychiatric:         Behavior: Behavior normal.         Thought Content: Thought content normal.         Judgment: Judgment normal.        Assessment:      Diagnosis Orders   1. Acute bilateral deep vein thrombosis (DVT) of popliteal veins (HCC)                 Plan:     Bilateral lower extremity DVT and bilateral pulmonary emboli. He is on Eliquis 5 mg 2 in the morning and 2 at night. Hematology is giving him this. His coagulopathy studies were okay. He will be on the blood thinner till February. At that point time we will stop it and as long as he goes clot free he will have not have to be on it any longer. If he clots again he will be back on blood thinner from now on. He travels a lot with his job and I told him to get some medium strength compression stockings for travel and have written him a prescription for that today and we will see him back in 6 months. Return in about 6 months (around 5/11/2023). Patient given educational materials- see patient instructions.   Discussed use, benefit, and side effects of prescribedmedications. All patient questions answered. Pt voiced understanding. Reviewedhealth maintenance. Instructed to continue current medications, diet and exercise. Patient agreed with treatment plan. **This report has been created usingvoice recognition software. It may contain minor errors which are inherent in voicerecognition technology. **    Electronically signed by Shani Allred MD on 11/11/2022 at 10:00 AM

## 2022-11-11 NOTE — TELEPHONE ENCOUNTER
Spoke with Gill Stern regarding Eliquis. Instructed him dose should be 5 mg twice a day. He was taking ten mg twice a day.

## 2023-02-03 NOTE — PROGRESS NOTES
Progress Note      Pt Name: Guru Whiting  YOB: 1980  MRN: [id number]    Date of evaluation: 2/6/2023  History Obtained From:  Patient, EMR    Portions of this note have been copied forward, however, changed to reflect the most current clinical status of this patient. Chief Complaint   Patient presents with    Follow-up     Pulmonary embolism, bilateral ([address]     HISTORY OF PRESENT ILLNESS/INTERVAL HISTORY:  Guru Whiting is a 42-year-old gentleman with a history of provoked bilateral PE/DVT (prolonged travel). He is compliant with Eliquis 5 mg po BID. He denies bleeding issues. Shoaib Last denies lower extremity swelling or claudication. He denies chest pain or shortness of breath. He has had no changes in health history or medications since last evaluated. Thrombophilia work-up has been negative to date. He returns to the clinic to discuss JAK2/reflex and protein test results as well as anticoagulation recommendations moving forward. He has an appointment to see vascular surgery next month. Diagnosis  Provoked DVT/PE, 8/2022 (prolonged travel)     Treatment summary  IV heparin  8/4/2022-transition to LMWH 1 mg/kg every 12 hours if no vascular intervention  8/26/2022 changed to Eliquis 10 mg po BID x7 days followed by 5 mg po BID thereafter; anticipate 6 months of anticoagulation, through 2/2023     Hematology history  Guru Whiting was first seen by Dr. Matthew Odell on 8/3/2022, as an inpatient at [facility]. The patient presented ER department with complaints of acute onset chest discomfort and short of breath with exertion. Short of breath started 3 days prior to presentation. Patient denies any prior history of DVT or PE. Family history of blood clots in his uncle who also suffered from lung cancer. Patient recalled that he had leg cramping about a week prior to arrival.  He also reports long periods of travel with his work.   He had been on an 18-hour flight to [place] about 1 month prior to presentation even more recently, he had a nonstop 5-1/2 - 6 hour trip to [place], [place]. He is a former smoker, having quit 15 years ago. He denies testosterone use. 8/3/2022-chest x-ray was unremarkable  8/3/2022-CTA showed extensive bilateral pulm emboli, more numerous in the lower lobes. The lungs are grossly clear. 8/3/2020-US lower extremity bilaterally showed positive DVT in Lt Pop V, B/L PTV, Lt Stuart and Lt Soleal veins. No evidence of SVT or reflux noted at this time. 8/3/2022-2D echo showed preserved EF with RV dysfunction  8/4/2022-he was evaluated by vascular surgery, Dr. Lulu Murray. No vascular intervention recommended. 8/19/2022-evaluated in clinic-continue Lovenox for 1 additional week (patient wants to complete) followed by Eliquis 10 mg po BID x7 days and 5 mg po BID thereafter      Labs 8/4/2022:  Cardiolipin antibodies: IgG: <10, IgM: <10  Beta-2 Glycoprotein: IgG: <10, IgM: <10  Lupus Anticoagulant: not detected    Shoaib Ponce was seen in hematology clinic 8/19/2022. Thrombophilia evaluation requested and Eliquis prescribed (patient will complete 1 remaining week of Lovenox that he has on hand, then changed to Eliquis). Chest pain and shortness of breath are resolved and he denies lower extremity swelling or claudication. He reports compliance with Lovenox and denies bleeding. Labs 8/19/2022:   Factor 5 Leiden: negative   Antithrombin panel: Activity: 99  Protein C: 118   Functional Protein S: 100  Prothrombin Mutation: negative  FLOW for PNH: negative  JAK2 w/reflexes: requested, not completed    Venous Doppler 9/26/22 at [facility]: There is evidence of subacute to more chronic appearing partially recanalized deep vein thrombosis in the left lower extremityinvolving the popliteal vein. There is no evidence of deep venous thrombosis (DVT) in the right lower extremities. There is no evidence of superficial thrombophlebitis of the bilateral lower extremities. Shoaib Ponce was seen in follow-up 9/30/2022.   He is tolerating Eliquis without difficulty. No bleeding issues. Recommend a minimum of 6 months anticoagulation. Labs 2022:   Protein S Antigen: 117  JAK2 V617F:, Exon12-15:, CALR:, MPL mutations: All negative    Past Medical History:   Diagnosis Date    Acute pulmonary embolism without acute cor pulmonale (HCC) 2022    Allergic rhinitis     Dislocation of right shoulder joint     DVT of lower extremity, bilateral ([address] 2022    Laceration of left elbow      History reviewed. No pertinent surgical history. Current Outpatient Medications   Medication Sig Dispense Refill    apixaban (ELIQUIS) 5 MG TABS tablet Take 1 tablet by mouth 2 times daily 60 tablet 2    fexofenadine (ALLEGRA) 180 MG tablet Take 180 mg by mouth in the morning and 180 mg before bedtime. Multiple Vitamin (MULTIVITAMIN ADULT PO) Take by mouth       No current facility-administered medications for this visit. Allergies   Allergen Reactions    Morphine Anaphylaxis     Social History     Tobacco Use    Smoking status: Former     Packs/day: 0.25     Types: Cigarettes     Start date: 2006     Quit date: 2006     Years since quittin.1    Smokeless tobacco: Never   Vaping Use    Vaping Use: Never used   Substance Use Topics    Alcohol use: Not Currently    Drug use: Never     Family History   Problem Relation Age of Onset    Cancer Mother 64        Bone Cancer    Diabetes Father     Diabetes Brother      Review of Systems   Constitutional:  Negative for fatigue and fever. HENT:  Negative for dental problem, hearing loss, mouth sores, nosebleeds, sore throat and trouble swallowing. Eyes:  Negative for discharge and itching. Respiratory:  Negative for cough, shortness of breath and wheezing. Cardiovascular:  Negative for chest pain, palpitations and leg swelling. Gastrointestinal:  Negative for abdominal pain, constipation, diarrhea, nausea and vomiting.    Endocrine: Negative for cold intolerance and heat intolerance. Genitourinary:  Negative for dysuria, frequency, hematuria and urgency. Musculoskeletal:  Negative for arthralgias, joint swelling and myalgias. Skin:  Negative for pallor and rash. Allergic/Immunologic: Negative for environmental allergies and immunocompromised state. Neurological:  Negative for seizures, syncope and numbness. Hematological:  Negative for adenopathy. Does not bruise/bleed easily. Psychiatric/Behavioral:  Negative for agitation, behavioral problems and confusion. The patient is not nervous/anxious. Physical Exam  Vitals reviewed. Constitutional:       General: He is not in acute distress. Appearance: He is well-developed. He is not toxic-appearing or diaphoretic. Comments: overweight   HENT:      Head: Normocephalic and atraumatic. Right Ear: External ear normal.      Left Ear: External ear normal.      Nose: Nose normal.      Mouth/Throat:      Mouth: Mucous membranes are moist.   Eyes:      General: No scleral icterus. Right eye: No discharge. Left eye: No discharge. Conjunctiva/sclera: Conjunctivae normal.   Neck:      Trachea: No tracheal deviation. Cardiovascular:      Rate and Rhythm: Normal rate and regular rhythm. Pulmonary:      Effort: Pulmonary effort is normal. No respiratory distress. Breath sounds: Normal breath sounds. No wheezing or rales. Abdominal:      General: Bowel sounds are normal. There is no distension. Palpations: Abdomen is soft. Tenderness: There is no abdominal tenderness. There is no guarding. Genitourinary:     Comments: Exam deferred  Musculoskeletal:         General: No tenderness or deformity. Cervical back: Neck supple. No muscular tenderness. Comments: Normal ROM all four extremities   Lymphadenopathy:      Cervical:      Right cervical: No superficial or deep cervical adenopathy. Left cervical: No superficial or deep cervical adenopathy.       Upper Body: Right upper body: No supraclavicular adenopathy. Left upper body: No supraclavicular adenopathy. Comments:      Skin:     General: Skin is warm and dry. Findings: No rash. Neurological:      Mental Status: He is alert and oriented to person, place, and time. Comments: follows commands, non-focal   Psychiatric:         Behavior: Behavior normal. Behavior is cooperative. Thought Content: Thought content normal.         Judgment: Judgment normal.      Comments: Alert and oriented to person, place and time. Vitals:    02/06/23 1029   BP: 132/75   Pulse: 82   SpO2: 97%   Weight: 242 lb 11.2 oz (110.1 kg)      Wt Readings from Last 3 Encounters:   02/06/23 242 lb 11.2 oz (110.1 kg)   11/11/22 258 lb (117 kg)   09/30/22 255 lb (115.7 kg)     Result Date: 8/3/2022  Unremarkable chest Recommendation: Follow up as clinically indicated. Electronically Signed by Suzy Mccarty MD at 03-Aug-2022 10:58:01 AM             CTA PULMONARY W CONTRAST    Result Date: 8/3/2022  1. Extensive bilateral pulmonary emboli, more numerous in the lower lobes. 2. The lungs are grossly clear. Recommendation: Follow up as clinically indicated. All CT scans at this facility utilize dose modulation, iterative reconstruction, and/or weight based dosing when appropriate to reduce radiation dose to as low as reasonably achievable. Amended by Janet Riley MD at 03-Aug-2022 12:29:08 PM Electronically Signed by Janet Riley MD at 03-Aug-2022 12:23:24 PM              CBC:   Lab Results   Component Value Date    WBC 8.98 02/06/2023    HGB 16.4 02/06/2023    HCT 49.1 02/06/2023    MCV 95.7 (H) 02/06/2023     02/06/2023      Labs 9/30/2022:   Protein S Antigen: 117  JAK2 V617F:, Exon12-15:, CALR:, MPL mutations: All negative    Assessment  1. History of pulmonary embolus (PE)    2. History of deep venous thrombosis (DVT) of distal vein of left lower extremity    3. Anticoagulated    4.  BMI 36.0-36.9,adult 5. Care plan discussed with patient      #Provoked DVT/PE 8/2022  -CTA as noted in the hematology history above, US lower extremity positive DVT  -2D echo 08/03/22:  LVEF 55-60%. Abnormal (paradoxical) motion consistent with right ventricular volume overload and/or elevated RV end-diastolic pressure. Normal diastolic filling pattern for age. Dilated right ventricular size with reduced RV function.  -Troponin normal, proBNP normal  -PESI score @diagnosis            Essentially, findings of extensive pulmonary embolism. 2D echo does show dilated RV with decreased function but with normal troponin and proBNP. No intervention from vascular standpoint. repeat venous Doppler 9/26/22 at [facility]: There is evidence of subacute to more chronic appearing partially recanalized deep vein thrombosis in the left lower extremityinvolving the popliteal vein. There is no evidence of deep venous thrombosis (DVT) in the right lower extremities. There is no evidence of superficial thrombophlebitis of the bilateral lower extremities. Thrombophilia work-up negative  Patient initiated Eliquis 10 mg po BID 8/26/2022, followed by 5 mg po BID thereafter. Recommend continue Eliquis for a minimum of 6 months for provoked PE/DVT as discussed with Dr. Luis Carlos Belcher. Patient instructed to continue Eliquis through 2/28/2023, then discontinue    BMI 36.0-36.9  Body mass index is 36.9 kg/m². Federal guidelines recommend that people under the age of 72 should have a BMI of 18.5-25 and people age 72 and older should have a BMI of 23-30. If yours is outside the range, we recommend you utilize a diet and exercise program to get yours into the range. We also recommend you speak with your primary care doctor should any specific advice be needed regarding special diets or programs which would be appropriate for your circumstances. Care plan discussed with patient  All questions answered.     Recommend age-appropriate tumor screenings    No orders of the defined types were placed in this encounter. Return in about 6 months (around 8/6/2023) for follow up with DINA Napoles. I have seen, examined and reviewed this patient medication list, appropriate labs and imaging studies. I reviewed relevant medical records and others physicians notes. I discussed the plan of care with the patient. I answered all questions to the patients satisfaction. I have also reviewed the chief complaint (CC) and part of the history (History of Present Illness (HPI), Past Family Social History [facility]), or Review of Systems (ROS) and made changes when appropriate. Dictated utilizing Dragon transcription software.         DINA Tompkins  11:12 AM  2/6/2023

## 2023-02-06 ENCOUNTER — OFFICE VISIT (OUTPATIENT)
Dept: HEMATOLOGY | Age: 43
End: 2023-02-06
Payer: COMMERCIAL

## 2023-02-06 ENCOUNTER — HOSPITAL ENCOUNTER (OUTPATIENT)
Dept: INFUSION THERAPY | Age: 43
Discharge: HOME | End: 2023-02-06
Payer: COMMERCIAL

## 2023-02-06 VITALS
SYSTOLIC BLOOD PRESSURE: 132 MMHG | OXYGEN SATURATION: 97 % | DIASTOLIC BLOOD PRESSURE: 75 MMHG | HEART RATE: 82 BPM | WEIGHT: 242.7 LBS | BODY MASS INDEX: 36.9 KG/M2

## 2023-02-06 DIAGNOSIS — Z86.711 HISTORY OF PULMONARY EMBOLUS (PE): Primary | ICD-10-CM

## 2023-02-06 DIAGNOSIS — Z71.89 CARE PLAN DISCUSSED WITH PATIENT: ICD-10-CM

## 2023-02-06 DIAGNOSIS — I82.433 ACUTE BILATERAL DEEP VEIN THROMBOSIS (DVT) OF POPLITEAL VEINS (HCC): ICD-10-CM

## 2023-02-06 DIAGNOSIS — I26.99 PULMONARY EMBOLISM, BILATERAL (HCC): ICD-10-CM

## 2023-02-06 DIAGNOSIS — Z79.01 ANTICOAGULATED: ICD-10-CM

## 2023-02-06 DIAGNOSIS — Z86.718 HISTORY OF DEEP VENOUS THROMBOSIS (DVT) OF DISTAL VEIN OF LEFT LOWER EXTREMITY: ICD-10-CM

## 2023-02-06 LAB
BASOPHILS ABSOLUTE: 0.05 K/UL (ref 0.01–0.08)
BASOPHILS RELATIVE PERCENT: 0.6 % (ref 0.1–1.2)
EOSINOPHILS ABSOLUTE: 0.49 K/UL (ref 0.04–0.54)
EOSINOPHILS RELATIVE PERCENT: 5.5 % (ref 0.7–7)
HCT VFR BLD CALC: 49.1 % (ref 40.1–51)
HEMOGLOBIN: 16.4 G/DL (ref 13.7–17.5)
LYMPHOCYTES ABSOLUTE: 2.1 K/UL (ref 1.18–3.74)
LYMPHOCYTES RELATIVE PERCENT: 23.4 % (ref 19.3–53.1)
MCH RBC QN AUTO: 32 PG (ref 25.7–32.2)
MCHC RBC AUTO-ENTMCNC: 33.4 G/DL (ref 32.3–36.5)
MCV RBC AUTO: 95.7 FL (ref 79–92.2)
MONOCYTES ABSOLUTE: 0.76 K/UL (ref 0.24–0.82)
MONOCYTES RELATIVE PERCENT: 8.5 % (ref 4.7–12.5)
NEUTROPHILS ABSOLUTE: 5.56 K/UL (ref 1.56–6.13)
NEUTROPHILS RELATIVE PERCENT: 61.8 % (ref 34–71.1)
PDW BLD-RTO: 13 % (ref 11.6–14.4)
PLATELET # BLD: 264 K/UL (ref 163–337)
PMV BLD AUTO: 10.3 FL (ref 7.4–10.4)
RBC # BLD: 5.13 M/UL (ref 4.63–6.08)
WBC # BLD: 8.98 K/UL (ref 4.23–9.07)

## 2023-02-06 PROCEDURE — 99213 OFFICE O/P EST LOW 20 MIN: CPT | Performed by: NURSE PRACTITIONER

## 2023-02-06 PROCEDURE — 36415 COLL VENOUS BLD VENIPUNCTURE: CPT

## 2023-02-06 PROCEDURE — 99211 OFF/OP EST MAY X REQ PHY/QHP: CPT

## 2023-02-06 PROCEDURE — 85025 COMPLETE CBC W/AUTO DIFF WBC: CPT

## 2023-02-06 ASSESSMENT — ENCOUNTER SYMPTOMS
TROUBLE SWALLOWING: 0
VOMITING: 0
ABDOMINAL PAIN: 0
SORE THROAT: 0
SHORTNESS OF BREATH: 0
NAUSEA: 0
WHEEZING: 0
CONSTIPATION: 0
EYE DISCHARGE: 0
DIARRHEA: 0
EYE ITCHING: 0
COUGH: 0

## 2023-05-11 ENCOUNTER — OFFICE VISIT (OUTPATIENT)
Dept: INTERNAL MEDICINE | Age: 43
End: 2023-05-11
Payer: COMMERCIAL

## 2023-05-11 VITALS
BODY MASS INDEX: 34.06 KG/M2 | WEIGHT: 224 LBS | HEART RATE: 79 BPM | DIASTOLIC BLOOD PRESSURE: 100 MMHG | SYSTOLIC BLOOD PRESSURE: 142 MMHG | OXYGEN SATURATION: 97 %

## 2023-05-11 DIAGNOSIS — I82.433 ACUTE BILATERAL DEEP VEIN THROMBOSIS (DVT) OF POPLITEAL VEINS (HCC): Primary | ICD-10-CM

## 2023-05-11 DIAGNOSIS — I26.99 BILATERAL PULMONARY EMBOLISM (HCC): ICD-10-CM

## 2023-05-11 DIAGNOSIS — I10 PRIMARY HYPERTENSION: ICD-10-CM

## 2023-05-11 PROCEDURE — 3077F SYST BP >= 140 MM HG: CPT | Performed by: INTERNAL MEDICINE

## 2023-05-11 PROCEDURE — 99214 OFFICE O/P EST MOD 30 MIN: CPT | Performed by: INTERNAL MEDICINE

## 2023-05-11 PROCEDURE — 3080F DIAST BP >= 90 MM HG: CPT | Performed by: INTERNAL MEDICINE

## 2023-05-11 RX ORDER — LISINOPRIL 10 MG/1
10 TABLET ORAL DAILY
Qty: 30 TABLET | Refills: 0 | Status: SHIPPED | OUTPATIENT
Start: 2023-05-11

## 2023-05-11 SDOH — ECONOMIC STABILITY: INCOME INSECURITY: HOW HARD IS IT FOR YOU TO PAY FOR THE VERY BASICS LIKE FOOD, HOUSING, MEDICAL CARE, AND HEATING?: NOT HARD AT ALL

## 2023-05-11 SDOH — ECONOMIC STABILITY: FOOD INSECURITY: WITHIN THE PAST 12 MONTHS, YOU WORRIED THAT YOUR FOOD WOULD RUN OUT BEFORE YOU GOT MONEY TO BUY MORE.: NEVER TRUE

## 2023-05-11 SDOH — ECONOMIC STABILITY: HOUSING INSECURITY
IN THE LAST 12 MONTHS, WAS THERE A TIME WHEN YOU DID NOT HAVE A STEADY PLACE TO SLEEP OR SLEPT IN A SHELTER (INCLUDING NOW)?: NO

## 2023-05-11 SDOH — ECONOMIC STABILITY: FOOD INSECURITY: WITHIN THE PAST 12 MONTHS, THE FOOD YOU BOUGHT JUST DIDN'T LAST AND YOU DIDN'T HAVE MONEY TO GET MORE.: NEVER TRUE

## 2023-05-11 ASSESSMENT — ENCOUNTER SYMPTOMS
SINUS PRESSURE: 0
EYE DISCHARGE: 0
ABDOMINAL PAIN: 0
TROUBLE SWALLOWING: 0
BACK PAIN: 0
WHEEZING: 0
EYE ITCHING: 0
ABDOMINAL DISTENTION: 0
NAUSEA: 0
SHORTNESS OF BREATH: 0
SORE THROAT: 0
BLOOD IN STOOL: 0
VOMITING: 0

## 2023-05-11 ASSESSMENT — PATIENT HEALTH QUESTIONNAIRE - PHQ9
SUM OF ALL RESPONSES TO PHQ QUESTIONS 1-9: 0
2. FEELING DOWN, DEPRESSED OR HOPELESS: 0
SUM OF ALL RESPONSES TO PHQ QUESTIONS 1-9: 0
1. LITTLE INTEREST OR PLEASURE IN DOING THINGS: 0
SUM OF ALL RESPONSES TO PHQ9 QUESTIONS 1 & 2: 0
SUM OF ALL RESPONSES TO PHQ QUESTIONS 1-9: 0
SUM OF ALL RESPONSES TO PHQ QUESTIONS 1-9: 0

## 2023-05-11 NOTE — PROGRESS NOTES
[address] INTER[facility]  [address] [address]  [address] [address]  Dept: [phone]  Dept Fax: [phone]: [phone]      Visit Date: 2023    Toni Worthingtons a 37 y.o. male who presents today for:  Chief Complaint   Patient presents with    Follow-up     6m         HPI:     Is in for 6-month follow-up visit. He has chronic DVT and chronic pulmonary emboli in had a recurrence of a pulmonary embolus after stopping his Eliquis back in February. He is now on it for the rest of his life and his blood pressures today as well. Past Medical History:   Diagnosis Date    Acute pulmonary embolism without acute cor pulmonale (HCC) 2022    Allergic rhinitis     Dislocation of right shoulder joint     DVT of lower extremity, bilateral ([address] 2022    Laceration of left elbow       No past surgical history on file. Family History   Problem Relation Age of Onset    Cancer Mother 64        Bone Cancer    Diabetes Father     Diabetes Brother        Social History     Tobacco Use    Smoking status: Former     Packs/day: 0.25     Types: Cigarettes     Start date: 2006     Quit date: 2006     Years since quittin.4    Smokeless tobacco: Never   Substance Use Topics    Alcohol use: Not Currently      Current Outpatient Medications   Medication Sig Dispense Refill    lisinopril (PRINIVIL;ZESTRIL) 10 MG tablet Take 1 tablet by mouth daily 30 tablet 0    apixaban (ELIQUIS) 5 MG TABS tablet Take 1 tablet by mouth 2 times daily 60 tablet 2    fexofenadine (ALLEGRA) 180 MG tablet Take 1 tablet by mouth 2 times daily      Multiple Vitamin (MULTIVITAMIN ADULT PO) Take by mouth       No current facility-administered medications for this visit. Allergies   Allergen Reactions    Morphine Anaphylaxis         Subjective:     Review of Systems   Constitutional:  Negative for activity change, appetite change, fatigue and fever.    HENT:  Negative for

## 2023-05-25 ENCOUNTER — OFFICE VISIT (OUTPATIENT)
Dept: INTERNAL MEDICINE | Age: 43
End: 2023-05-25
Payer: COMMERCIAL

## 2023-05-25 VITALS
DIASTOLIC BLOOD PRESSURE: 104 MMHG | HEART RATE: 61 BPM | OXYGEN SATURATION: 92 % | SYSTOLIC BLOOD PRESSURE: 146 MMHG | WEIGHT: 225 LBS | BODY MASS INDEX: 34.21 KG/M2

## 2023-05-25 DIAGNOSIS — I26.99 PULMONARY EMBOLISM, BILATERAL (HCC): ICD-10-CM

## 2023-05-25 DIAGNOSIS — I82.433 ACUTE BILATERAL DEEP VEIN THROMBOSIS (DVT) OF POPLITEAL VEINS (HCC): ICD-10-CM

## 2023-05-25 DIAGNOSIS — I10 PRIMARY HYPERTENSION: Primary | ICD-10-CM

## 2023-05-25 PROCEDURE — 3080F DIAST BP >= 90 MM HG: CPT | Performed by: INTERNAL MEDICINE

## 2023-05-25 PROCEDURE — 99214 OFFICE O/P EST MOD 30 MIN: CPT | Performed by: INTERNAL MEDICINE

## 2023-05-25 PROCEDURE — 3077F SYST BP >= 140 MM HG: CPT | Performed by: INTERNAL MEDICINE

## 2023-05-25 RX ORDER — LISINOPRIL 20 MG/1
20 TABLET ORAL DAILY
Qty: 30 TABLET | Refills: 0 | Status: SHIPPED | OUTPATIENT
Start: 2023-05-25

## 2023-05-25 ASSESSMENT — ENCOUNTER SYMPTOMS
EYE DISCHARGE: 0
TROUBLE SWALLOWING: 0
WHEEZING: 0
VOMITING: 0
ABDOMINAL PAIN: 0
SHORTNESS OF BREATH: 0
SORE THROAT: 0
EYE ITCHING: 0
ABDOMINAL DISTENTION: 0
NAUSEA: 0
BLOOD IN STOOL: 0
SINUS PRESSURE: 0
BACK PAIN: 0

## 2023-05-25 NOTE — TELEPHONE ENCOUNTER
From today's visit --    Last OV 5/25/2023  Next OV Visit date not found      Requested Prescriptions     Pending Prescriptions Disp Refills    lisinopril (PRINIVIL;ZESTRIL) 20 MG tablet 30 tablet 0     Sig: Take 1 tablet by mouth daily

## 2023-05-25 NOTE — PROGRESS NOTES
[address] [facility]  [address] [address]  [address] [address]  Dept: [phone]  Dept Fax: [phone]: [phone]      Visit Date: 2023    Tamar Crowell a 37 y.o. male who presents today for:  Chief Complaint   Patient presents with    Follow-up     2w BP check         HPI:     For 2-week follow-up on his blood pressure. We started him on lisinopril 10 mg a day and he is back for follow-up today. Took his pill this morning Ebright before he came here he drank a red bull and his blood pressure is terrible    Past Medical History:   Diagnosis Date    Acute pulmonary embolism without acute cor pulmonale [address] [address] 2022    Allergic rhinitis     Dislocation of right shoulder joint     DVT of lower extremity, bilateral ([facility] 2022    Laceration of left elbow       No past surgical history on file. Family History   Problem Relation Age of Onset    Cancer Mother 64        Bone Cancer    Diabetes Father     Diabetes Brother        Social History     Tobacco Use    Smoking status: Former     Packs/day: 0.25     Types: Cigarettes     Start date: 2006     Quit date: 2006     Years since quittin.4    Smokeless tobacco: Never   Substance Use Topics    Alcohol use: Not Currently      Current Outpatient Medications   Medication Sig Dispense Refill    lisinopril (PRINIVIL;ZESTRIL) 10 MG tablet Take 1 tablet by mouth daily 30 tablet 0    apixaban (ELIQUIS) 5 MG TABS tablet Take 1 tablet by mouth 2 times daily 60 tablet 2    fexofenadine (ALLEGRA) 180 MG tablet Take 1 tablet by mouth 2 times daily      Multiple Vitamin (MULTIVITAMIN ADULT PO) Take by mouth       No current facility-administered medications for this visit. Allergies   Allergen Reactions    Morphine Anaphylaxis         Subjective:     Review of Systems   Constitutional:  Negative for activity change, appetite change, fatigue and fever.    HENT:  Negative for congestion,

## 2023-06-08 ENCOUNTER — OFFICE VISIT (OUTPATIENT)
Dept: INTERNAL MEDICINE | Age: 43
End: 2023-06-08
Payer: COMMERCIAL

## 2023-06-08 VITALS
HEART RATE: 77 BPM | WEIGHT: 223 LBS | BODY MASS INDEX: 33.91 KG/M2 | DIASTOLIC BLOOD PRESSURE: 90 MMHG | OXYGEN SATURATION: 96 % | SYSTOLIC BLOOD PRESSURE: 130 MMHG

## 2023-06-08 DIAGNOSIS — I10 PRIMARY HYPERTENSION: Primary | ICD-10-CM

## 2023-06-08 PROCEDURE — 99213 OFFICE O/P EST LOW 20 MIN: CPT | Performed by: INTERNAL MEDICINE

## 2023-06-08 PROCEDURE — 3075F SYST BP GE 130 - 139MM HG: CPT | Performed by: INTERNAL MEDICINE

## 2023-06-08 PROCEDURE — 3080F DIAST BP >= 90 MM HG: CPT | Performed by: INTERNAL MEDICINE

## 2023-06-08 ASSESSMENT — ENCOUNTER SYMPTOMS
EYE ITCHING: 0
BLOOD IN STOOL: 0
WHEEZING: 0
SINUS PRESSURE: 0
ABDOMINAL DISTENTION: 0
TROUBLE SWALLOWING: 0
NAUSEA: 0
SORE THROAT: 0
BACK PAIN: 0
EYE DISCHARGE: 0
VOMITING: 0
SHORTNESS OF BREATH: 0
ABDOMINAL PAIN: 0

## 2023-06-08 NOTE — PROGRESS NOTES
[facility]  [facility] [place] INTER[facility] MEDICINE  [address]  [facility] [place] [address]  Dept: [phone]  Dept Fax: [phone]: [phone]      Visit Date: 2023    Carrol Francisco a 37 y.o. male who presents today for:  Chief Complaint   Patient presents with    Follow-up     BP FU         HPI:     Follow-up on his blood pressure. When he was seen last we doubled his lisinopril from 10 mg to 20 mg.  I told him to take 2 of his stands until labor finished and then start the 20 mg on the new prescription. He still taking 10 mg daily and has not doubled his prescription    Past Medical History:   Diagnosis Date    Acute pulmonary embolism without acute cor pulmonale (HCC) 2022    Allergic rhinitis     Dislocation of right shoulder joint     DVT of lower extremity, bilateral ([address] 2022    Laceration of left elbow       No past surgical history on file. Family History   Problem Relation Age of Onset    Cancer Mother 64        Bone Cancer    Diabetes Father     Diabetes Brother        Social History     Tobacco Use    Smoking status: Former     Packs/day: 0.25     Types: Cigarettes     Start date: 2006     Quit date: 2006     Years since quittin.5    Smokeless tobacco: Never   Substance Use Topics    Alcohol use: Not Currently      Current Outpatient Medications   Medication Sig Dispense Refill    lisinopril (PRINIVIL;ZESTRIL) 20 MG tablet Take 1 tablet by mouth daily 30 tablet 0    apixaban (ELIQUIS) 5 MG TABS tablet Take 1 tablet by mouth 2 times daily 60 tablet 2    fexofenadine (ALLEGRA) 180 MG tablet Take 1 tablet by mouth 2 times daily      Multiple Vitamin (MULTIVITAMIN ADULT PO) Take by mouth       No current facility-administered medications for this visit.      Allergies   Allergen Reactions    Morphine Anaphylaxis         Subjective:     Review of Systems   Constitutional:  Negative for activity change, appetite change, fatigue and

## 2023-07-06 RX ORDER — LISINOPRIL 20 MG/1
20 TABLET ORAL DAILY
Qty: 90 TABLET | Refills: 1 | Status: SHIPPED | OUTPATIENT
Start: 2023-07-06

## 2023-07-06 NOTE — TELEPHONE ENCOUNTER
Last OV 6/8/2023  Next OV 9/8/2023      Requested Prescriptions     Pending Prescriptions Disp Refills    lisinopril (PRINIVIL;ZESTRIL) 20 MG tablet [Pharmacy Med Name: LISINOPRIL 20MG TABLETS] 90 tablet 1     Sig: TAKE 1 TABLET BY MOUTH DAILY

## 2023-08-07 ENCOUNTER — HOSPITAL ENCOUNTER (OUTPATIENT)
Dept: INFUSION THERAPY | Age: 43
Discharge: HOME | End: 2023-08-07
Payer: COMMERCIAL

## 2023-08-07 ENCOUNTER — OFFICE VISIT (OUTPATIENT)
Dept: HEMATOLOGY | Age: 43
End: 2023-08-07
Payer: COMMERCIAL

## 2023-08-07 VITALS
BODY MASS INDEX: 33.06 KG/M2 | SYSTOLIC BLOOD PRESSURE: 132 MMHG | HEART RATE: 72 BPM | OXYGEN SATURATION: 98 % | DIASTOLIC BLOOD PRESSURE: 72 MMHG | WEIGHT: 217.4 LBS

## 2023-08-07 DIAGNOSIS — Z71.89 CARE PLAN DISCUSSED WITH PATIENT: ICD-10-CM

## 2023-08-07 DIAGNOSIS — Z79.01 ANTICOAGULATED: ICD-10-CM

## 2023-08-07 DIAGNOSIS — Z86.718 HISTORY OF DEEP VENOUS THROMBOSIS (DVT) OF DISTAL VEIN OF LEFT LOWER EXTREMITY: ICD-10-CM

## 2023-08-07 DIAGNOSIS — I82.433 ACUTE BILATERAL DEEP VEIN THROMBOSIS (DVT) OF POPLITEAL VEINS (HCC): ICD-10-CM

## 2023-08-07 DIAGNOSIS — Z86.711 HISTORY OF PULMONARY EMBOLUS (PE): Primary | ICD-10-CM

## 2023-08-07 DIAGNOSIS — I26.99 PULMONARY EMBOLISM, BILATERAL (HCC): ICD-10-CM

## 2023-08-07 DIAGNOSIS — E66.9 OBESITY (BMI 30.0-34.9): ICD-10-CM

## 2023-08-07 LAB
ERYTHROCYTE [DISTWIDTH] IN BLOOD BY AUTOMATED COUNT: 12.7 % (ref 11.6–14.4)
HCT VFR BLD AUTO: 43 % (ref 40.1–51)
HGB BLD-MCNC: 15.5 G/DL (ref 13.7–17.5)
LYMPHOCYTES # BLD: 1.98 K/UL (ref 1.18–3.74)
LYMPHOCYTES NFR BLD: 20.3 % (ref 19.3–53.1)
MCH RBC QN AUTO: 32 PG (ref 25.7–32.2)
MCHC RBC AUTO-ENTMCNC: 36 G/DL (ref 32.3–36.5)
MCV RBC AUTO: 88.8 FL (ref 79–92.2)
MONOCYTES # BLD: 0.89 K/UL (ref 0.24–0.82)
MONOCYTES NFR BLD: 9.1 % (ref 4.7–12.5)
NEUTROPHILS # BLD: 6.36 K/UL (ref 1.56–6.13)
NEUTS SEG NFR BLD: 65.5 % (ref 34–71.1)
PLATELET # BLD AUTO: 151 K/UL (ref 163–337)
PMV BLD AUTO: 10.9 FL (ref 7.4–10.4)
RBC # BLD AUTO: 4.84 M/UL (ref 4.63–6.08)
WBC # BLD AUTO: 9.73 K/UL (ref 4.23–9.07)

## 2023-08-07 PROCEDURE — 3075F SYST BP GE 130 - 139MM HG: CPT | Performed by: NURSE PRACTITIONER

## 2023-08-07 PROCEDURE — 99211 OFF/OP EST MAY X REQ PHY/QHP: CPT

## 2023-08-07 PROCEDURE — 99213 OFFICE O/P EST LOW 20 MIN: CPT | Performed by: NURSE PRACTITIONER

## 2023-08-07 PROCEDURE — 3078F DIAST BP <80 MM HG: CPT | Performed by: NURSE PRACTITIONER

## 2023-08-07 PROCEDURE — 85025 COMPLETE CBC W/AUTO DIFF WBC: CPT

## 2023-08-07 PROCEDURE — 36415 COLL VENOUS BLD VENIPUNCTURE: CPT

## 2023-08-07 ASSESSMENT — ENCOUNTER SYMPTOMS
COUGH: 0
NAUSEA: 0
SHORTNESS OF BREATH: 0
VOMITING: 0
ABDOMINAL PAIN: 0
SORE THROAT: 0
TROUBLE SWALLOWING: 0
EYE ITCHING: 0
CONSTIPATION: 0
EYE DISCHARGE: 0
WHEEZING: 0
DIARRHEA: 0

## 2023-09-08 ENCOUNTER — OFFICE VISIT (OUTPATIENT)
Dept: INTERNAL MEDICINE | Age: 43
End: 2023-09-08
Payer: COMMERCIAL

## 2023-09-08 VITALS
WEIGHT: 215 LBS | DIASTOLIC BLOOD PRESSURE: 78 MMHG | BODY MASS INDEX: 32.69 KG/M2 | SYSTOLIC BLOOD PRESSURE: 120 MMHG | HEART RATE: 74 BPM | OXYGEN SATURATION: 97 %

## 2023-09-08 DIAGNOSIS — I10 PRIMARY HYPERTENSION: Primary | ICD-10-CM

## 2023-09-08 PROCEDURE — 3078F DIAST BP <80 MM HG: CPT | Performed by: INTERNAL MEDICINE

## 2023-09-08 PROCEDURE — 99213 OFFICE O/P EST LOW 20 MIN: CPT | Performed by: INTERNAL MEDICINE

## 2023-09-08 PROCEDURE — 3074F SYST BP LT 130 MM HG: CPT | Performed by: INTERNAL MEDICINE

## 2023-09-08 ASSESSMENT — ENCOUNTER SYMPTOMS
BACK PAIN: 0
ABDOMINAL DISTENTION: 0
NAUSEA: 0
SORE THROAT: 0
BLOOD IN STOOL: 0
EYE ITCHING: 0
TROUBLE SWALLOWING: 0
SINUS PRESSURE: 0
VOMITING: 0
ABDOMINAL PAIN: 0
EYE DISCHARGE: 0
SHORTNESS OF BREATH: 0
WHEEZING: 0

## 2023-09-08 NOTE — PROGRESS NOTES
wheezing or rales. Abdominal:      General: Bowel sounds are normal. There is no distension. Palpations: Abdomen is soft. There is no mass. Tenderness: There is no abdominal tenderness. There is no guarding or rebound. Musculoskeletal:         General: No tenderness or deformity. Normal range of motion. Cervical back: Normal range of motion and neck supple. Lymphadenopathy:      Cervical: No cervical adenopathy. Skin:     General: Skin is warm and dry. Coloration: Skin is not pale. Findings: No erythema or rash. Neurological:      Mental Status: He is alert and oriented to person, place, and time. Cranial Nerves: No cranial nerve deficit. Motor: No abnormal muscle tone. Coordination: Coordination normal.      Deep Tendon Reflexes: Reflexes are normal and symmetric. Reflexes normal.   Psychiatric:         Behavior: Behavior normal.         Thought Content: Thought content normal.         Judgment: Judgment normal.        Assessment:      Diagnosis Orders   1. Primary hypertension                 Plan:     Better control. His blood pressure by me is 120/78. He is on lisinopril 20 mg daily and will continue the same dose. Return next in 6 months for recheck    No follow-ups on file. Patient given educational materials- see patient instructions. Discussed use, benefit, and side effects of prescribedmedications. All patient questions answered. Pt voiced understanding. Reviewedhealth maintenance. Instructed to continue current medications, diet and exercise. Patient agreed with treatment plan. **This report has been created usingvoice recognition software. It may contain minor errors which are inherent in voicerecognition technology. **    Electronically signed by Brigitte Smith MD on 9/8/2023 at 9:01 AM

## 2024-01-02 RX ORDER — LISINOPRIL 20 MG/1
20 TABLET ORAL DAILY
Qty: 90 TABLET | Refills: 1 | Status: SHIPPED | OUTPATIENT
Start: 2024-01-02

## 2024-01-02 NOTE — TELEPHONE ENCOUNTER
Last OV 9/8/2023  Next OV 4/8/2024      Requested Prescriptions     Pending Prescriptions Disp Refills    lisinopril (PRINIVIL;ZESTRIL) 20 MG tablet [Pharmacy Med Name: LISINOPRIL 20MG TABLETS] 90 tablet 1     Sig: TAKE 1 TABLET BY MOUTH DAILY

## 2024-02-05 ENCOUNTER — TELEPHONE (OUTPATIENT)
Dept: HEMATOLOGY | Age: 44
End: 2024-02-05

## 2024-02-05 NOTE — TELEPHONE ENCOUNTER
Called patient and reminded patient of their appointment on 2/7/2024 and patient confirmed they would be here.

## 2024-02-06 DIAGNOSIS — Z86.711 HISTORY OF PULMONARY EMBOLUS (PE): Primary | ICD-10-CM

## 2024-02-06 NOTE — PROGRESS NOTES
reviewed/interpreted by me:  CBC:   Lab Results   Component Value Date    WBC 7.21 02/07/2024    HGB 15.3 02/07/2024    HCT 43.4 02/07/2024    MCV 91.8 02/07/2024     02/07/2024    LYMPHOPCT 24.5 02/07/2024    RBC 4.73 02/07/2024    MCH 32.3 (H) 02/07/2024    MCHC 35.3 02/07/2024    RDW 12.1 02/07/2024     Lab Results   Component Value Date    NEUTROABS 4.34 02/07/2024     Assessment  1. History of pulmonary embolus (PE)    2. History of deep venous thrombosis (DVT) of distal vein of left lower extremity    3. Obesity (BMI 30.0-34.9)    4. Care plan discussed with patient    5. Hypertension, unspecified type      #Provoked DVT/PE 8/2022  -CTA as noted in the hematology history above, US lower extremity positive DVT  -2D echo 08/03/22:  LVEF 55-60%.  Abnormal (paradoxical) motion consistent with right ventricular volume overload and/or elevated RV end-diastolic pressure. Normal diastolic filling pattern for age.  Dilated right ventricular size with reduced RV function.  -Troponin normal, proBNP normal  -PESI score @diagnosis: 54      H/o extensive pulmonary embolism.    2D echo showed dilated RV with decreased function but with normal troponin and proBNP.    No intervention from vascular standpoint.    repeat venous Doppler BLE 9/26/22 at [facility]: There is evidence of subacute to more chronic appearing partially recanalized deep vein thrombosis in the left lower extremityinvolving the popliteal vein.  There is no evidence of deep venous thrombosis (DVT) in the right lower extremities.  There is no evidence of superficial thrombophlebitis of the bilateral lower extremities.    repeat venous Doppler BLE 12/15/2023 at [facility] by Dr. Andrew Cheema: negative for DVT/SVT of left and right lower extremities    Thrombophilia work-up negative  S/p Eliquis 10 mg po BID 8/26/2022 -  9/2023  Recommend continue aspirin 81 mg daily (instructed to take daily, not BID, understandings verbalized) if no

## 2024-02-07 ENCOUNTER — HOSPITAL ENCOUNTER (OUTPATIENT)
Dept: INFUSION THERAPY | Age: 44
Discharge: HOME | End: 2024-02-07
Payer: COMMERCIAL

## 2024-02-07 ENCOUNTER — OFFICE VISIT (OUTPATIENT)
Dept: HEMATOLOGY | Age: 44
End: 2024-02-07
Payer: COMMERCIAL

## 2024-02-07 VITALS
WEIGHT: 214 LBS | HEART RATE: 72 BPM | HEIGHT: 68 IN | BODY MASS INDEX: 32.43 KG/M2 | SYSTOLIC BLOOD PRESSURE: 120 MMHG | TEMPERATURE: 97.9 F | DIASTOLIC BLOOD PRESSURE: 76 MMHG | OXYGEN SATURATION: 98 %

## 2024-02-07 DIAGNOSIS — E66.9 OBESITY (BMI 30.0-34.9): ICD-10-CM

## 2024-02-07 DIAGNOSIS — I10 HYPERTENSION, UNSPECIFIED TYPE: ICD-10-CM

## 2024-02-07 DIAGNOSIS — Z86.711 HISTORY OF PULMONARY EMBOLUS (PE): Primary | ICD-10-CM

## 2024-02-07 DIAGNOSIS — Z86.718 HISTORY OF DEEP VENOUS THROMBOSIS (DVT) OF DISTAL VEIN OF LEFT LOWER EXTREMITY: ICD-10-CM

## 2024-02-07 DIAGNOSIS — Z86.711 HISTORY OF PULMONARY EMBOLUS (PE): ICD-10-CM

## 2024-02-07 DIAGNOSIS — Z71.89 CARE PLAN DISCUSSED WITH PATIENT: ICD-10-CM

## 2024-02-07 LAB
ALBUMIN SERPL-MCNC: 4 G/DL (ref 3.5–5.2)
ALP SERPL-CCNC: 52 U/L (ref 40–130)
ALT SERPL-CCNC: 26 U/L (ref 5–41)
ANION GAP SERPL CALCULATED.3IONS-SCNC: 11 MMOL/L (ref 7–19)
AST SERPL-CCNC: 16 U/L (ref 5–40)
BASOPHILS # BLD: 0.04 K/UL (ref 0.01–0.08)
BASOPHILS NFR BLD: 0.6 % (ref 0.1–1.2)
BILIRUB SERPL-MCNC: 0.8 MG/DL (ref 0.2–1.2)
BUN SERPL-MCNC: 10 MG/DL (ref 6–20)
CALCIUM SERPL-MCNC: 9.1 MG/DL (ref 8.6–10)
CHLORIDE SERPL-SCNC: 107 MMOL/L (ref 98–111)
CO2 SERPL-SCNC: 24 MMOL/L (ref 22–29)
CREAT SERPL-MCNC: 1 MG/DL (ref 0.5–1.2)
EOSINOPHIL # BLD: 0.44 K/UL (ref 0.04–0.54)
EOSINOPHIL NFR BLD: 6.1 % (ref 0.7–7)
ERYTHROCYTE [DISTWIDTH] IN BLOOD BY AUTOMATED COUNT: 12.1 % (ref 11.6–14.4)
GLUCOSE SERPL-MCNC: 74 MG/DL (ref 74–109)
HCT VFR BLD AUTO: 43.4 % (ref 40.1–51)
HGB BLD-MCNC: 15.3 G/DL (ref 13.7–17.5)
LYMPHOCYTES # BLD: 1.77 K/UL (ref 1.18–3.74)
LYMPHOCYTES NFR BLD: 24.5 % (ref 19.3–53.1)
MCH RBC QN AUTO: 32.3 PG (ref 25.7–32.2)
MCHC RBC AUTO-ENTMCNC: 35.3 G/DL (ref 32.3–36.5)
MCV RBC AUTO: 91.8 FL (ref 79–92.2)
MONOCYTES # BLD: 0.61 K/UL (ref 0.24–0.82)
MONOCYTES NFR BLD: 8.5 % (ref 4.7–12.5)
NEUTROPHILS # BLD: 4.34 K/UL (ref 1.56–6.13)
NEUTS SEG NFR BLD: 60.2 % (ref 34–71.1)
PLATELET # BLD AUTO: 249 K/UL (ref 163–337)
PMV BLD AUTO: 9.8 FL (ref 7.4–10.4)
POTASSIUM SERPL-SCNC: 3.8 MMOL/L (ref 3.5–5)
PROT SERPL-MCNC: 7.6 G/DL (ref 6.6–8.7)
RBC # BLD AUTO: 4.73 M/UL (ref 4.63–6.08)
SODIUM SERPL-SCNC: 142 MMOL/L (ref 136–145)
WBC # BLD AUTO: 7.21 K/UL (ref 4.23–9.07)

## 2024-02-07 PROCEDURE — 3074F SYST BP LT 130 MM HG: CPT | Performed by: NURSE PRACTITIONER

## 2024-02-07 PROCEDURE — 3078F DIAST BP <80 MM HG: CPT | Performed by: NURSE PRACTITIONER

## 2024-02-07 PROCEDURE — 36415 COLL VENOUS BLD VENIPUNCTURE: CPT

## 2024-02-07 PROCEDURE — 85025 COMPLETE CBC W/AUTO DIFF WBC: CPT

## 2024-02-07 PROCEDURE — 99212 OFFICE O/P EST SF 10 MIN: CPT

## 2024-02-07 PROCEDURE — 99213 OFFICE O/P EST LOW 20 MIN: CPT | Performed by: NURSE PRACTITIONER

## 2024-02-07 ASSESSMENT — ENCOUNTER SYMPTOMS
EYE ITCHING: 0
CONSTIPATION: 0
EYE DISCHARGE: 0
NAUSEA: 0
WHEEZING: 0
TROUBLE SWALLOWING: 0
VOMITING: 0
SORE THROAT: 0
ABDOMINAL PAIN: 0
COUGH: 0
DIARRHEA: 0
SHORTNESS OF BREATH: 0

## 2024-04-02 DIAGNOSIS — I10 PRIMARY HYPERTENSION: Primary | ICD-10-CM

## 2024-04-02 DIAGNOSIS — I26.99 PULMONARY EMBOLISM, BILATERAL (HCC): ICD-10-CM

## 2024-04-02 DIAGNOSIS — Z00.00 ANNUAL PHYSICAL EXAM: ICD-10-CM

## 2024-04-08 DIAGNOSIS — I10 PRIMARY HYPERTENSION: ICD-10-CM

## 2024-04-08 DIAGNOSIS — Z00.00 ANNUAL PHYSICAL EXAM: ICD-10-CM

## 2024-04-08 DIAGNOSIS — I26.99 PULMONARY EMBOLISM, BILATERAL (HCC): ICD-10-CM

## 2024-04-08 LAB
ALBUMIN SERPL-MCNC: 4.3 G/DL (ref 3.5–5.2)
ALP SERPL-CCNC: 68 U/L (ref 40–130)
ALT SERPL-CCNC: 43 U/L (ref 5–41)
ANION GAP SERPL CALCULATED.3IONS-SCNC: 11 MMOL/L (ref 7–19)
AST SERPL-CCNC: 31 U/L (ref 5–40)
BILIRUB SERPL-MCNC: 0.4 MG/DL (ref 0.2–1.2)
BUN SERPL-MCNC: 9 MG/DL (ref 6–20)
CALCIUM SERPL-MCNC: 9.4 MG/DL (ref 8.6–10)
CHLORIDE SERPL-SCNC: 103 MMOL/L (ref 98–111)
CHOLEST SERPL-MCNC: 150 MG/DL (ref 160–199)
CO2 SERPL-SCNC: 26 MMOL/L (ref 22–29)
CREAT SERPL-MCNC: 1 MG/DL (ref 0.5–1.2)
ERYTHROCYTE [DISTWIDTH] IN BLOOD BY AUTOMATED COUNT: 12.1 % (ref 11.5–14.5)
GLUCOSE SERPL-MCNC: 86 MG/DL (ref 74–109)
HCT VFR BLD AUTO: 44.5 % (ref 42–52)
HDLC SERPL-MCNC: 58 MG/DL (ref 55–121)
HGB BLD-MCNC: 15 G/DL (ref 14–18)
LDLC SERPL CALC-MCNC: 77 MG/DL
MCH RBC QN AUTO: 31.8 PG (ref 27–31)
MCHC RBC AUTO-ENTMCNC: 33.7 G/DL (ref 33–37)
MCV RBC AUTO: 94.3 FL (ref 80–94)
PLATELET # BLD AUTO: 278 K/UL (ref 130–400)
PMV BLD AUTO: 10.1 FL (ref 9.4–12.4)
POTASSIUM SERPL-SCNC: 4 MMOL/L (ref 3.5–5)
PROT SERPL-MCNC: 7.7 G/DL (ref 6.6–8.7)
RBC # BLD AUTO: 4.72 M/UL (ref 4.7–6.1)
SODIUM SERPL-SCNC: 140 MMOL/L (ref 136–145)
TRIGL SERPL-MCNC: 76 MG/DL (ref 0–149)
WBC # BLD AUTO: 8.1 K/UL (ref 4.8–10.8)

## 2024-04-09 ENCOUNTER — OFFICE VISIT (OUTPATIENT)
Dept: INTERNAL MEDICINE | Age: 44
End: 2024-04-09
Payer: COMMERCIAL

## 2024-04-09 VITALS
SYSTOLIC BLOOD PRESSURE: 128 MMHG | WEIGHT: 211 LBS | BODY MASS INDEX: 31.98 KG/M2 | DIASTOLIC BLOOD PRESSURE: 78 MMHG | HEIGHT: 68 IN | OXYGEN SATURATION: 98 % | HEART RATE: 78 BPM

## 2024-04-09 DIAGNOSIS — J31.0 CHRONIC RHINITIS: ICD-10-CM

## 2024-04-09 DIAGNOSIS — Z12.5 SCREENING FOR MALIGNANT NEOPLASM OF PROSTATE: Primary | ICD-10-CM

## 2024-04-09 DIAGNOSIS — I10 PRIMARY HYPERTENSION: Primary | ICD-10-CM

## 2024-04-09 LAB — PSA SERPL-MCNC: 1 NG/ML (ref 0–4)

## 2024-04-09 PROCEDURE — 3078F DIAST BP <80 MM HG: CPT | Performed by: INTERNAL MEDICINE

## 2024-04-09 PROCEDURE — 3074F SYST BP LT 130 MM HG: CPT | Performed by: INTERNAL MEDICINE

## 2024-04-09 PROCEDURE — 99214 OFFICE O/P EST MOD 30 MIN: CPT | Performed by: INTERNAL MEDICINE

## 2024-04-09 RX ORDER — LISINOPRIL 20 MG/1
20 TABLET ORAL DAILY
Qty: 90 TABLET | Refills: 1 | Status: SHIPPED | OUTPATIENT
Start: 2024-04-09

## 2024-04-09 ASSESSMENT — PATIENT HEALTH QUESTIONNAIRE - PHQ9
SUM OF ALL RESPONSES TO PHQ QUESTIONS 1-9: 0
SUM OF ALL RESPONSES TO PHQ9 QUESTIONS 1 & 2: 0
1. LITTLE INTEREST OR PLEASURE IN DOING THINGS: NOT AT ALL

## 2024-04-09 ASSESSMENT — ENCOUNTER SYMPTOMS
BLOOD IN STOOL: 0
EYE DISCHARGE: 0
NAUSEA: 0
WHEEZING: 0
SORE THROAT: 0
TROUBLE SWALLOWING: 0
SHORTNESS OF BREATH: 0
SINUS PRESSURE: 0
ABDOMINAL PAIN: 0
EYE ITCHING: 0
VOMITING: 0
ABDOMINAL DISTENTION: 0
BACK PAIN: 0

## 2024-04-09 NOTE — PROGRESS NOTES
[facility]  [facility] INTERNAL MEDICINE  [facility] [address]  [address]  [place] [address]  Dept: [phone]  Dept Fax: [phone]  Loc: [phone]      Visit Date: 2024    Michael Peters a 44 y.o. male who presents today for:  Chief Complaint   Patient presents with    Annual Exam         HPI:     This is annual exam.  He had lab for review has no new complaints    Past Medical History:   Diagnosis Date    Acute pulmonary embolism without acute cor pulmonale ([facility]) 2022    Allergic rhinitis     Dislocation of right shoulder joint     DVT of lower extremity, bilateral ([facility]) 2022    Hypertension     Laceration of left elbow       No past surgical history on file.    Family History   Problem Relation Age of Onset    Cancer Mother 61        Bone Cancer    Diabetes Father     Diabetes Brother     No Known Problems Maternal Grandmother     No Known Problems Maternal Grandfather     No Known Problems Paternal Grandmother     No Known Problems Paternal Grandfather        Social History     Tobacco Use    Smoking status: Former     Current packs/day: 0.00     Average packs/day: 0.3 packs/day for 0.9 years (0.2 ttl pk-yrs)     Types: Cigarettes     Start date: 2006     Quit date: 2006     Years since quittin.3    Smokeless tobacco: Never   Substance Use Topics    Alcohol use: Not Currently      Current Outpatient Medications   Medication Sig Dispense Refill    lisinopril (PRINIVIL;ZESTRIL) 20 MG tablet TAKE 1 TABLET BY MOUTH DAILY 90 tablet 1    fexofenadine (ALLEGRA) 180 MG tablet Take 1 tablet by mouth 2 times daily      Multiple Vitamin (MULTIVITAMIN ADULT PO) Take by mouth       No current facility-administered medications for this visit.     Allergies   Allergen Reactions    Morphine Anaphylaxis         Subjective:     Review of Systems   Constitutional:  Negative for activity change, appetite change, fatigue and fever.   HENT:  Negative for congestion,

## 2024-07-11 DIAGNOSIS — I10 PRIMARY HYPERTENSION: ICD-10-CM

## 2024-07-11 RX ORDER — LISINOPRIL 20 MG/1
20 TABLET ORAL DAILY
Qty: 90 TABLET | Refills: 1 | Status: SHIPPED | OUTPATIENT
Start: 2024-07-11

## 2024-07-11 NOTE — TELEPHONE ENCOUNTER
Patient calling 7/11/24 requesting a refill of their     lisinopril (PRINIVIL;ZESTRIL) 20 MG tablet    medication. Patient would like it sent to   [id number] - [place], [place] - [address] - P [phone] - F [phone] .

## 2024-07-11 NOTE — TELEPHONE ENCOUNTER
Last OV 4/9/2024  Next OV 10/10/2024      Requested Prescriptions     Pending Prescriptions Disp Refills    lisinopril (PRINIVIL;ZESTRIL) 20 MG tablet 90 tablet 1     Sig: Take 1 tablet by mouth daily

## 2024-10-15 ENCOUNTER — OFFICE VISIT (OUTPATIENT)
Dept: INTERNAL MEDICINE | Age: 44
End: 2024-10-15
Payer: COMMERCIAL

## 2024-10-15 VITALS
OXYGEN SATURATION: 97 % | HEART RATE: 65 BPM | HEIGHT: 67 IN | SYSTOLIC BLOOD PRESSURE: 130 MMHG | DIASTOLIC BLOOD PRESSURE: 78 MMHG | WEIGHT: 226 LBS | BODY MASS INDEX: 35.47 KG/M2

## 2024-10-15 DIAGNOSIS — I82.433 ACUTE BILATERAL DEEP VEIN THROMBOSIS (DVT) OF POPLITEAL VEINS (HCC): ICD-10-CM

## 2024-10-15 DIAGNOSIS — I10 PRIMARY HYPERTENSION: Primary | ICD-10-CM

## 2024-10-15 DIAGNOSIS — A59.9 TRICHOMONIASIS: ICD-10-CM

## 2024-10-15 PROCEDURE — 3075F SYST BP GE 130 - 139MM HG: CPT | Performed by: INTERNAL MEDICINE

## 2024-10-15 PROCEDURE — 99214 OFFICE O/P EST MOD 30 MIN: CPT | Performed by: INTERNAL MEDICINE

## 2024-10-15 PROCEDURE — 3078F DIAST BP <80 MM HG: CPT | Performed by: INTERNAL MEDICINE

## 2024-10-15 RX ORDER — METRONIDAZOLE 500 MG/1
2000 TABLET ORAL ONCE
Qty: 4 TABLET | Refills: 0 | Status: SHIPPED | OUTPATIENT
Start: 2024-10-15 | End: 2024-10-15

## 2024-10-15 SDOH — ECONOMIC STABILITY: INCOME INSECURITY: HOW HARD IS IT FOR YOU TO PAY FOR THE VERY BASICS LIKE FOOD, HOUSING, MEDICAL CARE, AND HEATING?: NOT HARD AT ALL

## 2024-10-15 SDOH — ECONOMIC STABILITY: FOOD INSECURITY: WITHIN THE PAST 12 MONTHS, THE FOOD YOU BOUGHT JUST DIDN'T LAST AND YOU DIDN'T HAVE MONEY TO GET MORE.: NEVER TRUE

## 2024-10-15 SDOH — ECONOMIC STABILITY: FOOD INSECURITY: WITHIN THE PAST 12 MONTHS, YOU WORRIED THAT YOUR FOOD WOULD RUN OUT BEFORE YOU GOT MONEY TO BUY MORE.: NEVER TRUE

## 2024-10-15 NOTE — PROGRESS NOTES
Systems    Objective:      BP (!) 143/86 (Position: Sitting, Cuff Size: Medium Adult)   Pulse 65   Ht 1.702 m (5' 7\")   Wt 102.5 kg (226 lb)   SpO2 97%   BMI 35.40 kg/m²    Physical Exam     Assessment:      Diagnosis Orders   1. Primary hypertension        2. Acute bilateral deep vein thrombosis (DVT) of popliteal veins (HCC)        3. Trichomoniasis              Assessment & Plan   Plan:     Hypertension well-controlled.  Blood pressure is 130/78 by me.  20 mg daily we will continue the same dose.    DVT which is stable at this time no new needs or issues.    Trichomoniasis.  His girlfriend is being actively treated for trichomoniasis he needs a refill prescription for as well given the 2 g dose of Flagyl x 1 dose.    Will see him back in 6 months with lab work and yearly    No follow-ups on file.     Patient given educational materials- see patient instructions.  Discussed use, benefit, and side effects of prescribedmedications.  All patient questions answered.  Pt voiced understanding. Reviewedhealth maintenance.  Instructed to continue current medications, diet and exercise.Patient agreed with treatment plan.     **This report has been created usingvoice recognition software. It may contain minor errors which are inherent in voicerecognition technology.**    Electronically signed by Zack Kim MD on 10/15/2024 at 10:20 AM

## 2024-11-04 ENCOUNTER — OFFICE VISIT (OUTPATIENT)
Dept: INTERNAL MEDICINE | Age: 44
End: 2024-11-04

## 2024-11-04 VITALS
HEIGHT: 68 IN | WEIGHT: 226 LBS | BODY MASS INDEX: 34.25 KG/M2 | HEART RATE: 78 BPM | OXYGEN SATURATION: 98 % | TEMPERATURE: 101.1 F

## 2024-11-04 DIAGNOSIS — U07.1 COVID-19: ICD-10-CM

## 2024-11-04 DIAGNOSIS — U07.1 COVID: Primary | ICD-10-CM

## 2024-11-04 LAB
INFLUENZA A ANTIGEN, POC: NEGATIVE
INFLUENZA B ANTIGEN, POC: NEGATIVE
LOT EXPIRE DATE: ABNORMAL
LOT KIT NUMBER: ABNORMAL
SARS-COV-2, POC: DETECTED
VALID INTERNAL CONTROL: PRESENT
VENDOR AND KIT NAME POC: ABNORMAL

## 2024-11-04 RX ORDER — NIRMATRELVIR AND RITONAVIR 150-100 MG
KIT ORAL
Qty: 20 TABLET | Refills: 0 | Status: SHIPPED | OUTPATIENT
Start: 2024-11-04 | End: 2024-11-09

## 2024-11-04 ASSESSMENT — ENCOUNTER SYMPTOMS
SINUS PRESSURE: 0
VOMITING: 0
EYE ITCHING: 0
EYE DISCHARGE: 0
NAUSEA: 0
BACK PAIN: 0
TROUBLE SWALLOWING: 0
BLOOD IN STOOL: 0
ABDOMINAL DISTENTION: 0
SORE THROAT: 0
SHORTNESS OF BREATH: 0
ABDOMINAL PAIN: 0
WHEEZING: 0
COUGH: 1

## 2024-11-04 NOTE — PROGRESS NOTES
[facility]  [facility] INTERNAL MEDICINE  [address] [place] [address] [address]  [address]  [place] [address]  Dept: [phone]  Dept Fax: [phone]  Loc: [phone]      Visit Date: 2024    Michael Peters a 44 y.o. male who presents today for:  Chief Complaint   Patient presents with    Congestion    Cough         HPI:     Patient here with a 24-hour history of cough congestion and fever.    Past Medical History:   Diagnosis Date    Acute pulmonary embolism without acute cor pulmonale ([facility]) 2022    Allergic rhinitis     Dislocation of right shoulder joint     DVT of lower extremity, bilateral ([facility]) 2022    Hypertension     Laceration of left elbow       No past surgical history on file.    Family History   Problem Relation Age of Onset    Cancer Mother 61        Bone Cancer    Diabetes Father     Diabetes Brother     No Known Problems Maternal Grandmother     No Known Problems Maternal Grandfather     No Known Problems Paternal Grandmother     No Known Problems Paternal Grandfather        Social History     Tobacco Use    Smoking status: Former     Current packs/day: 0.00     Average packs/day: 0.3 packs/day for 0.9 years (0.2 ttl pk-yrs)     Types: Cigarettes     Start date: 2006     Quit date: 2006     Years since quittin.9    Smokeless tobacco: Never   Substance Use Topics    Alcohol use: Not Currently      Current Outpatient Medications   Medication Sig Dispense Refill    lisinopril (PRINIVIL;ZESTRIL) 20 MG tablet Take 1 tablet by mouth daily 90 tablet 1    fexofenadine (ALLEGRA) 180 MG tablet Take 1 tablet by mouth 2 times daily      Multiple Vitamin (MULTIVITAMIN ADULT PO) Take by mouth       No current facility-administered medications for this visit.     Allergies   Allergen Reactions    Morphine Anaphylaxis         Subjective:     Review of Systems   Constitutional:  Positive for fatigue and fever. Negative for activity change and appetite change.   HENT:

## 2025-01-09 DIAGNOSIS — I10 PRIMARY HYPERTENSION: ICD-10-CM

## 2025-01-09 RX ORDER — LISINOPRIL 20 MG/1
20 TABLET ORAL DAILY
Qty: 90 TABLET | Refills: 1 | OUTPATIENT
Start: 2025-01-09

## 2025-04-12 DIAGNOSIS — I10 PRIMARY HYPERTENSION: ICD-10-CM

## 2025-04-14 RX ORDER — LISINOPRIL 20 MG/1
20 TABLET ORAL DAILY
Qty: 90 TABLET | Refills: 1 | OUTPATIENT
Start: 2025-04-14

## 2025-04-21 DIAGNOSIS — I10 PRIMARY HYPERTENSION: ICD-10-CM

## 2025-04-21 RX ORDER — LISINOPRIL 20 MG/1
20 TABLET ORAL DAILY
Qty: 90 TABLET | Refills: 1 | OUTPATIENT
Start: 2025-04-21

## 2025-04-21 RX ORDER — LISINOPRIL 20 MG/1
20 TABLET ORAL DAILY
Qty: 90 TABLET | Refills: 1 | Status: CANCELLED | OUTPATIENT
Start: 2025-04-21

## 2025-04-22 ENCOUNTER — TELEPHONE (OUTPATIENT)
Dept: INTERNAL MEDICINE | Age: 45
End: 2025-04-22

## 2025-04-22 NOTE — TELEPHONE ENCOUNTER
Patient calling 4/22/25 requesting a refill of their     lisinopril (PRINIVIL;ZESTRIL) 20 MG tablet    medication. Patient would like it sent to [id number] - [place], [place] - [address] - P [phone] - F [phone]  .

## 2025-04-24 DIAGNOSIS — A59.9 TRICHOMONIASIS: ICD-10-CM

## 2025-04-24 DIAGNOSIS — I10 PRIMARY HYPERTENSION: ICD-10-CM

## 2025-04-24 DIAGNOSIS — I82.433 ACUTE BILATERAL DEEP VEIN THROMBOSIS (DVT) OF POPLITEAL VEINS (HCC): ICD-10-CM

## 2025-04-24 LAB
ALBUMIN SERPL-MCNC: 4.3 G/DL (ref 3.5–5.2)
ALP SERPL-CCNC: 57 U/L (ref 40–129)
ALT SERPL-CCNC: 26 U/L (ref 10–50)
ANION GAP SERPL CALCULATED.3IONS-SCNC: 10 MMOL/L (ref 8–16)
AST SERPL-CCNC: 21 U/L (ref 10–50)
BASOPHILS # BLD: 0 K/UL (ref 0–0.2)
BASOPHILS NFR BLD: 0.5 % (ref 0–1)
BILIRUB SERPL-MCNC: 0.8 MG/DL (ref 0.2–1.2)
BUN SERPL-MCNC: 11 MG/DL (ref 6–20)
CALCIUM SERPL-MCNC: 9.1 MG/DL (ref 8.6–10)
CHLORIDE SERPL-SCNC: 106 MMOL/L (ref 98–107)
CHOLEST SERPL-MCNC: 178 MG/DL (ref 0–199)
CO2 SERPL-SCNC: 25 MMOL/L (ref 22–29)
CREAT SERPL-MCNC: 1 MG/DL (ref 0.7–1.2)
EOSINOPHIL # BLD: 0.4 K/UL (ref 0–0.6)
EOSINOPHIL NFR BLD: 5.1 % (ref 0–5)
ERYTHROCYTE [DISTWIDTH] IN BLOOD BY AUTOMATED COUNT: 12.4 % (ref 11.5–14.5)
GLUCOSE SERPL-MCNC: 93 MG/DL (ref 70–99)
HCT VFR BLD AUTO: 43.4 % (ref 42–52)
HDLC SERPL-MCNC: 55 MG/DL (ref 40–60)
HGB BLD-MCNC: 15.2 G/DL (ref 14–18)
IMM GRANULOCYTES # BLD: 0 K/UL
LDLC SERPL CALC-MCNC: 107 MG/DL
LYMPHOCYTES # BLD: 2 K/UL (ref 1.1–4.5)
LYMPHOCYTES NFR BLD: 24.7 % (ref 20–40)
MCH RBC QN AUTO: 33 PG (ref 27–31)
MCHC RBC AUTO-ENTMCNC: 35 G/DL (ref 33–37)
MCV RBC AUTO: 94.1 FL (ref 80–94)
MONOCYTES # BLD: 0.7 K/UL (ref 0–0.9)
MONOCYTES NFR BLD: 8.7 % (ref 0–10)
NEUTROPHILS # BLD: 4.8 K/UL (ref 1.5–7.5)
NEUTS SEG NFR BLD: 60.9 % (ref 50–65)
PLATELET # BLD AUTO: 284 K/UL (ref 130–400)
PMV BLD AUTO: 9.9 FL (ref 9.4–12.4)
POTASSIUM SERPL-SCNC: 3.8 MMOL/L (ref 3.5–5.1)
PROT SERPL-MCNC: 7.9 G/DL (ref 6.4–8.3)
RBC # BLD AUTO: 4.61 M/UL (ref 4.7–6.1)
SODIUM SERPL-SCNC: 141 MMOL/L (ref 136–145)
TRIGL SERPL-MCNC: 78 MG/DL (ref 0–149)
WBC # BLD AUTO: 7.9 K/UL (ref 4.8–10.8)

## 2025-04-25 ENCOUNTER — OFFICE VISIT (OUTPATIENT)
Dept: INTERNAL MEDICINE | Age: 45
End: 2025-04-25
Payer: COMMERCIAL

## 2025-04-25 VITALS
WEIGHT: 219 LBS | SYSTOLIC BLOOD PRESSURE: 140 MMHG | OXYGEN SATURATION: 97 % | HEART RATE: 90 BPM | BODY MASS INDEX: 33.3 KG/M2 | DIASTOLIC BLOOD PRESSURE: 90 MMHG

## 2025-04-25 DIAGNOSIS — I10 PRIMARY HYPERTENSION: ICD-10-CM

## 2025-04-25 DIAGNOSIS — J31.0 CHRONIC RHINITIS: Primary | ICD-10-CM

## 2025-04-25 PROCEDURE — 3077F SYST BP >= 140 MM HG: CPT | Performed by: INTERNAL MEDICINE

## 2025-04-25 PROCEDURE — 99214 OFFICE O/P EST MOD 30 MIN: CPT | Performed by: INTERNAL MEDICINE

## 2025-04-25 PROCEDURE — 3080F DIAST BP >= 90 MM HG: CPT | Performed by: INTERNAL MEDICINE

## 2025-04-25 RX ORDER — LISINOPRIL 20 MG/1
20 TABLET ORAL DAILY
Qty: 90 TABLET | Refills: 1 | Status: SHIPPED | OUTPATIENT
Start: 2025-04-25

## 2025-04-25 SDOH — ECONOMIC STABILITY: FOOD INSECURITY: WITHIN THE PAST 12 MONTHS, YOU WORRIED THAT YOUR FOOD WOULD RUN OUT BEFORE YOU GOT MONEY TO BUY MORE.: NEVER TRUE

## 2025-04-25 SDOH — ECONOMIC STABILITY: FOOD INSECURITY: WITHIN THE PAST 12 MONTHS, THE FOOD YOU BOUGHT JUST DIDN'T LAST AND YOU DIDN'T HAVE MONEY TO GET MORE.: NEVER TRUE

## 2025-04-25 ASSESSMENT — ENCOUNTER SYMPTOMS
WHEEZING: 0
NAUSEA: 0
EYE DISCHARGE: 0
BLOOD IN STOOL: 0
EYE ITCHING: 0
SINUS PRESSURE: 0
ABDOMINAL PAIN: 0
SHORTNESS OF BREATH: 0
SORE THROAT: 0
BACK PAIN: 0
ABDOMINAL DISTENTION: 0
TROUBLE SWALLOWING: 0
VOMITING: 0

## 2025-04-25 ASSESSMENT — PATIENT HEALTH QUESTIONNAIRE - PHQ9
SUM OF ALL RESPONSES TO PHQ QUESTIONS 1-9: 0
SUM OF ALL RESPONSES TO PHQ QUESTIONS 1-9: 0
2. FEELING DOWN, DEPRESSED OR HOPELESS: NOT AT ALL
SUM OF ALL RESPONSES TO PHQ QUESTIONS 1-9: 0
1. LITTLE INTEREST OR PLEASURE IN DOING THINGS: NOT AT ALL
SUM OF ALL RESPONSES TO PHQ QUESTIONS 1-9: 0

## 2025-04-25 NOTE — PROGRESS NOTES
and Rhythm: Normal rate and regular rhythm.      Heart sounds: Normal heart sounds. No murmur heard.     No friction rub. No gallop.   Pulmonary:      Effort: Pulmonary effort is normal. No respiratory distress.      Breath sounds: Normal breath sounds. No wheezing or rales.   Abdominal:      General: Bowel sounds are normal. There is no distension.      Palpations: Abdomen is soft. There is no mass.      Tenderness: There is no abdominal tenderness. There is no guarding or rebound.   Musculoskeletal:         General: No tenderness or deformity. Normal range of motion.      Cervical back: Normal range of motion and neck supple.   Lymphadenopathy:      Cervical: No cervical adenopathy.   Skin:     General: Skin is warm and dry.      Coloration: Skin is not pale.      Findings: No erythema or rash.   Neurological:      Mental Status: He is alert and oriented to person, place, and time.      Cranial Nerves: No cranial nerve deficit.      Motor: No abnormal muscle tone.      Coordination: Coordination normal.      Deep Tendon Reflexes: Reflexes are normal and symmetric. Reflexes normal.   Psychiatric:         Behavior: Behavior normal.         Thought Content: Thought content normal.         Judgment: Judgment normal.          Assessment:      Diagnosis Orders   1. Chronic rhinitis        2. Primary hypertension  lisinopril (PRINIVIL;ZESTRIL) 20 MG tablet            Assessment & Plan   Plan:     Hypertension poorly controlled because he ran out of his medicine about 2 weeks ago.  His blood pressure was 140 or 90.  He is on lisinopril and supposed to take 20 mg daily.  Will get him back on it.  Otherwise he has been doing well with no new complaints or issues.  Will bring him back in for his regular visit with lab    Chronic rhinitis stable on his Allegra that he uses regularly.  Also supplements with Flonase.    No follow-ups on file.     Patient given educational materials- see patient instructions.  Discussed use,